# Patient Record
Sex: FEMALE | Race: WHITE | NOT HISPANIC OR LATINO | Employment: UNEMPLOYED | URBAN - METROPOLITAN AREA
[De-identification: names, ages, dates, MRNs, and addresses within clinical notes are randomized per-mention and may not be internally consistent; named-entity substitution may affect disease eponyms.]

---

## 2017-02-15 ENCOUNTER — APPOINTMENT (OUTPATIENT)
Dept: GENERAL RADIOLOGY | Facility: CLINIC | Age: 22
End: 2017-02-15
Attending: FAMILY MEDICINE
Payer: MEDICAID

## 2017-02-15 ENCOUNTER — HOSPITAL ENCOUNTER (EMERGENCY)
Facility: CLINIC | Age: 22
Discharge: HOME | End: 2017-02-15
Attending: FAMILY MEDICINE | Admitting: FAMILY MEDICINE
Payer: MEDICAID

## 2017-02-15 VITALS
TEMPERATURE: 98.1 F | DIASTOLIC BLOOD PRESSURE: 82 MMHG | OXYGEN SATURATION: 98 % | RESPIRATION RATE: 16 BRPM | SYSTOLIC BLOOD PRESSURE: 145 MMHG

## 2017-02-15 DIAGNOSIS — K62.89 RECTAL PAIN: ICD-10-CM

## 2017-02-15 DIAGNOSIS — K59.00 CONSTIPATION, UNSPECIFIED CONSTIPATION TYPE: ICD-10-CM

## 2017-02-15 DIAGNOSIS — R10.84 ABDOMINAL PAIN, GENERALIZED: ICD-10-CM

## 2017-02-15 LAB
ALBUMIN SERPL-MCNC: 3.3 G/DL (ref 3.4–5)
ALBUMIN UR-MCNC: NEGATIVE MG/DL
ALP SERPL-CCNC: 116 U/L (ref 40–150)
ALT SERPL W P-5'-P-CCNC: 27 U/L (ref 0–50)
ANION GAP SERPL CALCULATED.3IONS-SCNC: 5 MMOL/L (ref 3–14)
APPEARANCE UR: CLEAR
AST SERPL W P-5'-P-CCNC: 14 U/L (ref 0–35)
BACTERIA #/AREA URNS HPF: ABNORMAL /HPF
BASOPHILS # BLD AUTO: 0 10E9/L (ref 0–0.2)
BASOPHILS NFR BLD AUTO: 0.3 %
BILIRUB SERPL-MCNC: 0.3 MG/DL (ref 0.2–1.3)
BILIRUB UR QL STRIP: NEGATIVE
BUN SERPL-MCNC: 7 MG/DL (ref 7–30)
CALCIUM SERPL-MCNC: 8.6 MG/DL (ref 8.5–10.1)
CHLORIDE SERPL-SCNC: 109 MMOL/L (ref 96–110)
CO2 SERPL-SCNC: 29 MMOL/L (ref 20–32)
COLOR UR AUTO: YELLOW
CREAT SERPL-MCNC: 0.74 MG/DL (ref 0.5–1)
DIFFERENTIAL METHOD BLD: NORMAL
EOSINOPHIL # BLD AUTO: 0 10E9/L (ref 0–0.7)
EOSINOPHIL NFR BLD AUTO: 0 %
ERYTHROCYTE [DISTWIDTH] IN BLOOD BY AUTOMATED COUNT: 12.4 % (ref 10–15)
GFR SERPL CREATININE-BSD FRML MDRD: ABNORMAL ML/MIN/1.7M2
GLUCOSE SERPL-MCNC: 95 MG/DL (ref 70–99)
GLUCOSE UR STRIP-MCNC: NEGATIVE MG/DL
HCG UR QL: NEGATIVE
HCG UR QL: NORMAL
HCT VFR BLD AUTO: 43.3 % (ref 35–47)
HGB BLD-MCNC: 14 G/DL (ref 11.7–15.7)
HGB UR QL STRIP: NEGATIVE
IMM GRANULOCYTES # BLD: 0 10E9/L (ref 0–0.4)
IMM GRANULOCYTES NFR BLD: 0.3 %
INTERNAL QC OK POCT: YES
INTERNAL QC OK POCT: YES
KETONES UR STRIP-MCNC: NEGATIVE MG/DL
LEUKOCYTE ESTERASE UR QL STRIP: ABNORMAL
LIPASE SERPL-CCNC: 105 U/L (ref 73–393)
LYMPHOCYTES # BLD AUTO: 2.3 10E9/L (ref 0.8–5.3)
LYMPHOCYTES NFR BLD AUTO: 30.7 %
MCH RBC QN AUTO: 30.3 PG (ref 26.5–33)
MCHC RBC AUTO-ENTMCNC: 32.3 G/DL (ref 31.5–36.5)
MCV RBC AUTO: 94 FL (ref 78–100)
MONOCYTES # BLD AUTO: 0.4 10E9/L (ref 0–1.3)
MONOCYTES NFR BLD AUTO: 5.9 %
MUCOUS THREADS #/AREA URNS LPF: PRESENT /LPF
NEUTROPHILS # BLD AUTO: 4.7 10E9/L (ref 1.6–8.3)
NEUTROPHILS NFR BLD AUTO: 62.8 %
NITRATE UR QL: NEGATIVE
NRBC # BLD AUTO: 0 10*3/UL
NRBC BLD AUTO-RTO: 0 /100
PH UR STRIP: 5 PH (ref 5–7)
PLATELET # BLD AUTO: 208 10E9/L (ref 150–450)
POTASSIUM SERPL-SCNC: 4.4 MMOL/L (ref 3.4–5.3)
PROT SERPL-MCNC: 7.2 G/DL (ref 6.8–8.8)
RBC # BLD AUTO: 4.62 10E12/L (ref 3.8–5.2)
RBC #/AREA URNS AUTO: 1 /HPF (ref 0–2)
SODIUM SERPL-SCNC: 143 MMOL/L (ref 133–144)
SP GR UR STRIP: 1.02 (ref 1–1.03)
SQUAMOUS #/AREA URNS AUTO: 2 /HPF (ref 0–1)
URN SPEC COLLECT METH UR: ABNORMAL
UROBILINOGEN UR STRIP-MCNC: NORMAL MG/DL (ref 0–2)
WBC # BLD AUTO: 7.5 10E9/L (ref 4–11)
WBC #/AREA URNS AUTO: 2 /HPF (ref 0–2)

## 2017-02-15 PROCEDURE — 81001 URINALYSIS AUTO W/SCOPE: CPT | Performed by: FAMILY MEDICINE

## 2017-02-15 PROCEDURE — 85025 COMPLETE CBC W/AUTO DIFF WBC: CPT | Performed by: FAMILY MEDICINE

## 2017-02-15 PROCEDURE — 87086 URINE CULTURE/COLONY COUNT: CPT | Performed by: FAMILY MEDICINE

## 2017-02-15 PROCEDURE — 99283 EMERGENCY DEPT VISIT LOW MDM: CPT | Mod: 25 | Performed by: FAMILY MEDICINE

## 2017-02-15 PROCEDURE — 99283 EMERGENCY DEPT VISIT LOW MDM: CPT | Mod: Z6 | Performed by: FAMILY MEDICINE

## 2017-02-15 PROCEDURE — 81025 URINE PREGNANCY TEST: CPT | Mod: 91 | Performed by: FAMILY MEDICINE

## 2017-02-15 PROCEDURE — 96360 HYDRATION IV INFUSION INIT: CPT | Mod: 59 | Performed by: FAMILY MEDICINE

## 2017-02-15 PROCEDURE — 83690 ASSAY OF LIPASE: CPT | Performed by: FAMILY MEDICINE

## 2017-02-15 PROCEDURE — 25000128 H RX IP 250 OP 636: Performed by: FAMILY MEDICINE

## 2017-02-15 PROCEDURE — 46600 DIAGNOSTIC ANOSCOPY SPX: CPT | Performed by: FAMILY MEDICINE

## 2017-02-15 PROCEDURE — 74020 XR ABDOMEN 2 VW: CPT

## 2017-02-15 PROCEDURE — 80053 COMPREHEN METABOLIC PANEL: CPT | Performed by: FAMILY MEDICINE

## 2017-02-15 PROCEDURE — 36415 COLL VENOUS BLD VENIPUNCTURE: CPT | Performed by: FAMILY MEDICINE

## 2017-02-15 RX ADMIN — SODIUM CHLORIDE 1000 ML: 9 INJECTION, SOLUTION INTRAVENOUS at 20:02

## 2017-02-15 NOTE — ED AVS SNAPSHOT
[facility], Emergency Department    [address]    [place] [address]    Phone:  [phone]    Fax:  [phone]                                       Jennifer Okeefe   MRN: [id number]    Department:  [facility], Emergency Department   Date of Visit:  2/15/2017           Patient Information     Date Of Birth          1995        Your diagnoses for this visit were:     Abdominal pain, generalized     Rectal pain     Constipation, unspecified constipation type        You were seen by Manuel Castelan MD.      Follow-up Information     Follow up with Nicolas Flynn    Specialty:  Pediatrics    Why:  As needed    Contact information:    [facility]  [address] [facility] [address] [phone]          Discharge Instructions       Discharged to home plan to start fiber and prune juice as discussed follow-up with her primary M.D. or return if increased pain or rectal bleeding.    24 Hour Appointment Hotline       To make an appointment at any [facility], call [phone] ([phone]). If you don't have a family doctor or clinic, we will help you find one. [place] clinics are conveniently located to serve the needs of you and your family.             Review of your medicines      Our records show that you are taking the medicines listed below. If these are incorrect, please call your family doctor or clinic.        Dose / Directions Last dose taken    ADDERALL PO   Dose:  30 mg        Take 30 mg by mouth daily   Refills:  0        albuterol 108 (90 BASE) MCG/ACT Inhaler   Commonly known as:  PROAIR HFA/PROVENTIL HFA/VENTOLIN HFA   Dose:  2 puff        Inhale 2 puffs into the lungs every 6 hours   Refills:  0        CELEXA PO   Dose:  20 mg   Indication:  Depression        Take 20 mg by mouth   Refills:  0        SIMVASTATIN PO   Dose:  10 mg        Take 10 mg by mouth daily   Refills:  0        TRAZODONE HCL PO        Refills:  0        WELLBUTRIN  "XL PO   Dose:  300 mg        Take 300 mg by mouth   Refills:  0                Procedures and tests performed during your visit     Procedure/Test Number of Times Performed    Abdomen XR, 2 vw, flat and upright 1    CBC with platelets differential 1    Comprehensive metabolic panel 1    Lipase 1    UA with Microscopic reflex to Culture 1    Urine Culture Aerobic Bacterial 1    hCG qual urine POCT 2      Orders Needing Specimen Collection     None      Pending Results     Date and Time Order Name Status Description    2/15/2017 2029 Urine Culture Aerobic Bacterial Preliminary             Pending Culture Results     Date and Time Order Name Status Description    2/15/2017 2029 Urine Culture Aerobic Bacterial Preliminary             Thank you for choosing [facility]       Thank you for choosing [facility] for your care. Our goal is always to provide you with excellent care. Hearing back from our patients is one way we can continue to improve our services. Please take a few minutes to complete the written survey that you may receive in the mail after you visit with us. Thank you!        [employer]Eff[employer] Interactive Information     [employer]BRITE lets you send messages to your doctor, view your test results, renew your prescriptions, schedule appointments and more. To sign up, go to [email or web address] . Click on \"Log in\" on the left side of the screen, which will take you to the Welcome page. Then click on \"Sign up Now\" on the right side of the page.     You will be asked to enter the access code listed below, as well as some personal information. Please follow the directions to create your username and password.     Your access code is: [email or web address]  Expires: 2017 11:36 PM     Your access code will  in 90 days. If you need help or a new code, please call your [facility] clinic or [phone].        Care EveryWhere ID     This is your Care EveryWhere ID. This could be used by other organizations to access your [facility] medical " records  [id number]        After Visit Summary       This is your record. Keep this with you and show to your community pharmacist(s) and doctor(s) at your next visit.

## 2017-02-15 NOTE — ED AVS SNAPSHOT
[facility], Emergency Department    [address]    [facility] [address]    Phone:  [phone]    Fax:  [phone]                                       Jennifer Okeefe   MRN: [id number]    Department:  [facility], Emergency Department   Date of Visit:  2/15/2017           After Visit Summary Signature Page     I have received my discharge instructions, and my questions have been answered. I have discussed any challenges I see with this plan with the nurse or doctor.    ..........................................................................................................................................  Patient/Patient Representative Signature      ..........................................................................................................................................  Patient Representative Print Name and Relationship to Patient    ..................................................               ................................................  Date                                            Time    ..........................................................................................................................................  Reviewed by Signature/Title    ...................................................              ..............................................  Date                                                            Time

## 2017-02-16 LAB
BACTERIA SPEC CULT: NORMAL
Lab: NORMAL
MICRO REPORT STATUS: NORMAL
SPECIMEN SOURCE: NORMAL

## 2017-02-16 NOTE — DISCHARGE INSTRUCTIONS
Discharged to home plan to start fiber and prune juice as discussed follow-up with her primary M.D. or return if increased pain or rectal bleeding.

## 2017-02-16 NOTE — ED NOTES
Having abd. cramping for approx. 2 weeks.  Having both constipation and diarrhea.  Today noticed loose stool was orange in color and pt. c/o anal burning.

## 2017-02-16 NOTE — ED PROVIDER NOTES
[facility] EMERGENCY DEPARTMENT ([facility])    February 15, 2017    ED 4   History     Chief Complaint   Patient presents with     Abdominal Pain     HPI  Jennifer Okeefe is a 21 year old female who presents with abdominal pain. Patient has been known to ongoing constipation she has been experiencing  Constipation for the past week and a half accompanied with some loose stools in the last two days.  Patient also noted after a large hard stool today that she  Blood when she was wiping.    I have reviewed the Medications, Allergies, Past Medical and Surgical History, and Social History in the Epic system.    PERSONAL MEDICAL HISTORY  Past Medical History   Diagnosis Date     ADHD (attention deficit hyperactivity disorder)      Depressive disorder      Uncomplicated asthma      PAST SURGICAL HISTORY  History reviewed. No pertinent past surgical history.  FAMILY HISTORY  No family history on file.  SOCIAL HISTORY  Social History   Substance Use Topics     Smoking status: Never Smoker     Smokeless tobacco: Not on file     Alcohol use No     MEDICATIONS  No current facility-administered medications for this encounter.      Current Outpatient Prescriptions   Medication     Citalopram Hydrobromide (CELEXA PO)     BuPROPion HCl (WELLBUTRIN XL PO)     SIMVASTATIN PO     TRAZODONE HCL PO     Amphetamine-Dextroamphetamine (ADDERALL PO)     albuterol (PROAIR HFA, PROVENTIL HFA, VENTOLIN HFA) 108 (90 BASE) MCG/ACT inhaler     ALLERGIES  No Known Allergies      Review of Systems   Constitutional: Negative for fever.   Respiratory: Negative for shortness of breath.    Cardiovascular: Negative for chest pain.   Gastrointestinal: Positive for abdominal pain, constipation and diarrhea.   All other systems reviewed and are negative.      Physical Exam   BP: 145/88  Heart Rate: 110  Temp: 97.8  F (36.6  C)  Resp: 16  SpO2: 97 %  Physical Exam   Constitutional: No distress.   HENT:   Head: Atraumatic.   Mouth/Throat:  Oropharynx is clear and moist. No oropharyngeal exudate.   Eyes: Pupils are equal, round, and reactive to light. No scleral icterus.   Cardiovascular: Normal heart sounds and intact distal pulses.    Pulmonary/Chest: Breath sounds normal. No respiratory distress.   Abdominal: Soft. Bowel sounds are normal. There is no tenderness. There is no rebound and no guarding.   Musculoskeletal: She exhibits no edema or tenderness.   Skin: Skin is warm. No rash noted. She is not diaphoretic.   rectal exam revealed negative blood in the stool.    ED Course     ED Course     Procedures      anoscopic examination revealed no significant abnormalities there were no Internal or external hemorrhoids  And no obvious rectal bleeding no fissures noted.       Critical Care time:  None     ABDOMEN TWO VIEWS 2/15/2017 8:53 PM      HISTORY: Pain.     COMPARISON: None.         IMPRESSION: No free air. Normal bowel gas pattern.     BUFFY ARELLANO MD            Labs Ordered and Resulted from Time of ED Arrival Up to the Time of Departure from the ED   COMPREHENSIVE METABOLIC PANEL - Abnormal; Notable for the following:        Result Value    Albumin 3.3 (*)     All other components within normal limits   ROUTINE UA WITH MICROSCOPIC REFLEX TO CULTURE - Abnormal; Notable for the following:     Leukocyte Esterase Urine Moderate (*)     Bacteria Urine Moderate (*)     Squamous Epithelial /HPF Urine 2 (*)     Mucous Urine Present (*)     All other components within normal limits   HCG QUAL URINE POCT - Normal   HCG QUAL URINE POCT - Normal   CBC WITH PLATELETS DIFFERENTIAL   LIPASE       Assessments & Plan (with Medical Decision Making)       I have reviewed the nursing notes.    I have reviewed the findings, diagnosis, plan and need for follow up with the patient.  Patient with some intermittent abdominal pain as well as problems with  Constipation at this time evaluation did not reveal any acute rectal bleeding I discussed a regular bowel  program with pt and her mother and they will follow up   With her primary M.D. As well    Discharge Medication List as of 2/15/2017 11:36 PM          Final diagnoses:   Abdominal pain, generalized   Rectal pain   Constipation, unspecified constipation type       2/15/2017   [facility], [place], EMERGENCY DEPARTMENT     Manuel Castelan MD  02/17/17 0986       Manuel Castelan MD  02/17/17 9913

## 2017-02-17 ASSESSMENT — ENCOUNTER SYMPTOMS
DIARRHEA: 1
ABDOMINAL PAIN: 1
SHORTNESS OF BREATH: 0
CONSTIPATION: 1
FEVER: 0

## 2017-04-05 ENCOUNTER — PRE VISIT (OUTPATIENT)
Dept: SURGERY | Facility: CLINIC | Age: 22
End: 2017-04-05

## 2017-04-05 NOTE — TELEPHONE ENCOUNTER
1.  Date/reason for appt: 4/26/17- NBS     2.  Referring provider: DR. MAGDA MAO     3.  Call to patient (Yes / No - short description): No[facility]     4.  Previous care at / records requested from:     1. [facility] - faxed cover sheet.     5.  Other: E-mailed [facility] to sign as back up

## 2017-04-26 ENCOUNTER — OFFICE VISIT (OUTPATIENT)
Dept: SURGERY | Facility: CLINIC | Age: 22
End: 2017-04-26

## 2017-04-26 ENCOUNTER — ALLIED HEALTH/NURSE VISIT (OUTPATIENT)
Dept: SURGERY | Facility: CLINIC | Age: 22
End: 2017-04-26

## 2017-04-26 VITALS
DIASTOLIC BLOOD PRESSURE: 78 MMHG | BODY MASS INDEX: 41.63 KG/M2 | HEART RATE: 120 BPM | OXYGEN SATURATION: 97 % | HEIGHT: 70 IN | SYSTOLIC BLOOD PRESSURE: 125 MMHG | TEMPERATURE: 99.1 F | WEIGHT: 290.8 LBS

## 2017-04-26 DIAGNOSIS — E66.01 MORBID OBESITY, UNSPECIFIED OBESITY TYPE (H): Primary | ICD-10-CM

## 2017-04-26 RX ORDER — TOPIRAMATE 25 MG/1
TABLET, FILM COATED ORAL
Qty: 90 TABLET | Refills: 1 | Status: SHIPPED | OUTPATIENT
Start: 2017-04-26 | End: 2017-05-31

## 2017-04-26 NOTE — PATIENT INSTRUCTIONS
See Ivonne Amin in 1 month  See RD in 1 month    MEDICATION STARTED AT THIS APPOINTMENT  We are starting topiramate at bedtime.  Start one tab, 25 mg, for a week. Go up to 50 mg (2 tabs) for the next week. At the third week, take   3 tabs (75 mg).  Stay at 3 tabs until you are seen again. Call the nurse at [phone] if you have any questions or concerns. (Do not stop taking it if you don't think it's working. For some people it works even though they do not feel much different.)    Topiramate (Topamax) is a medication that is used most often to treat migraine headaches or for seizures. It has also been found to help with weight loss. Although it's not currently FDA approved for weight loss, it has been used safely for a number of years to help people who are carrying extra weight.     Just how topiramate helps with weight loss has not been exactly determined. However it seems to work on areas of the brain to quiet down signals related to eating.      Topiramate may make you:    >feel less interest in eating in between meals   >think less about food and eating   >find it easier to push the plate away   >find giving up pop easier    >have an easier time eating less    For some of our patients, the pills work right away. They feel and think quite differently about food. Other patients don't feel much of a change but find in fact they have lost weight! Like all weight loss medications, topiramate works best when you help it work.  This means:    1) Have less tempting high calorie (fattening) food around the house or office    2) Have lower calorie food (fruits, vegetables,low fat meats and dairy) for snacks    3) Eat out only one time or less each week.   4) Eat your meals at a table with the TV or computer off.    Side-effects. Topiramate is generally well tolerated. The main side-effects we see are:   Tingling in hands,feet, or face (usually not very troublesome)   Mental confusion and word finding trouble (about  10% of patients have this.)     Feeling sleepy or a bit dopey- this goes away very soon after starting.    One of the dangers of topiramate is the possibility of birth defects--if you get pregnant when you are on it, there is the risk that your baby will be born with a cleft lip or palate.  If you are on topiramate and of child bearing age, you need to be on a reliable form of birth control or refrain from sexual intercourse.     Please refer to the pharmacy insert for more information on side-effects. Since many pharmacists are not familiar with the use of topiramate in weight loss, calling the clinic will get you the most accurate information on the use of this medication for weight loss.     In order to get refills of this or any medication we prescribe you must be seen in the medical weight mgmt clinic every 2-3 months. Please have your pharmacy fax a refill request to [phone].

## 2017-04-26 NOTE — NURSING NOTE
"(   Chief Complaint   Patient presents with     Consult     NBS/BMI 39    )    ( Weight: 290 lb 12.8 oz )  ( Height: 5' 9.88\" )  ( BMI (Calculated): 41.95 )  ( Initial Weight: 290 lb 12.8 oz )  ( Cumulative weight loss (lbs): 0 )  (   )  (   )  ( Waist Circumference (cm): 145 cm )  (   )    ( BP: 125/78 )  (   )  ( Temp: 99.1  F (37.3  C) )  ( Temp src: Oral )  ( Pulse: 120 )  (   )  ( SpO2: 97 % )    ( There is no problem list on file for this patient.   )  (   Current Outpatient Prescriptions   Medication Sig Dispense Refill     Citalopram Hydrobromide (CELEXA PO) Take 20 mg by mouth       BuPROPion HCl (WELLBUTRIN XL PO) Take 300 mg by mouth       SIMVASTATIN PO Take 10 mg by mouth daily       albuterol (PROAIR HFA, PROVENTIL HFA, VENTOLIN HFA) 108 (90 BASE) MCG/ACT inhaler Inhale 2 puffs into the lungs every 6 hours       TRAZODONE HCL PO        Amphetamine-Dextroamphetamine (ADDERALL PO) Take 30 mg by mouth daily       )  ( Diabetes Eval:    )    ( Pain Eval:  Data Unavailable )    ( Wound Eval:       )    (   History   Smoking Status     Never Smoker   Smokeless Tobacco     Not on file    )    ( Signed By:  Pacheco Amaral; April 26, 2017; 1:43 PM )    "

## 2017-04-26 NOTE — PATIENT INSTRUCTIONS
"GOALS:  Relating To Eating:  - Eat slowly (20-30 minutes per meal), chewing foods well (25 chews per bite/applesauce consistency).  - 9\" Plate method (1/2 plate non-starchy vegetables/fruit, 1/4 plate lean protein, 1/4 plate whole grain starch - no more than 1/2 cup carb/meal). Okay to have Lean Cuisine and Smart One meals.   - Eat 3 meals per day. Focus on protein first at meals.   - Limit snacking between meals.     Relating to beverages:  - Eliminate calorie-containing beverages.  Try reducing [place] and Poweraid intake by 50% over the next month.    Relating to dietary supplements:  - Start a multivitamin containing iron daily.    Relating to activity:  - Continue exercising 2 days/week at school.     Radha Hsu RD, LD  [phone]    "

## 2017-04-26 NOTE — MR AVS SNAPSHOT
"                  MRN:[id number]                      After Visit Summary   4/26/2017    Jennifer Okeefe    MRN: [id number]           Visit Information        Provider Department      4/26/2017 2:30 PM Sarah Hsu RD  [facility]        Care Instructions    GOALS:  Relating To Eating:  - Eat slowly (20-30 minutes per meal), chewing foods well (25 chews per bite/applesauce consistency).  - 9\" Plate method (1/2 plate non-starchy vegetables/fruit, 1/4 plate lean protein, 1/4 plate whole grain starch - no more than 1/2 cup carb/meal). Okay to have Lean Cuisine and Smart One meals.   - Eat 3 meals per day. Focus on protein first at meals.   - Limit snacking between meals.     Relating to beverages:  - Eliminate calorie-containing beverages.  Try reducing [place] and Poweraid intake by 50% over the next month.    Relating to dietary supplements:  - Start a multivitamin containing iron daily.    Relating to activity:  - Continue exercising 2 days/week at school.     Radha Hsu RD, LD  [phone]         TELOS Information     TELOS gives you secure access to your electronic health record. If you see a primary care provider, you can also send messages to your care team and make appointments. If you have questions, please call your primary care clinic.  If you do not have a primary care provider, please call [phone] and they will assist you.      TELOS is an electronic gateway that provides easy, online access to your medical records. With TELOS, you can request a clinic appointment, read your test results, renew a prescription or communicate with your care team.     To access your existing account, please contact your [facility] or call [phone] for assistance.        Care EveryWhere ID     This is your Care EveryWhere ID. This could be used by other organizations to access your [facility] medical records  [id number]        "

## 2017-04-26 NOTE — LETTER
"2017       RE: Jennifer Okeefe  [address]  [facility] [address]     Dear Colleague,    Thank you for referring your patient, Jennifer Okeefe, to the [facility] SURGICAL WEIGHT MANAGEMENT at [facility]. Please see a copy of my visit note below.    New Bariatric Surgery Consultation Note    RE: Jennifer Okeefe  MR#: [id number]  : 1995      Referring provider:       2017   Who referred you? Dr. Nicolas Flynn       Chief Complaint/Reason for visit: evaluation for possible weight loss surgery    Dear Nicolas Flynn (General),    I had the pleasure of seeing your patient, Jennifer Okeefe, to evaluate her obesity and consider her for possible weight loss surgery. As you know, Jennifer Okeefe is 21 year old.  She has a height of 5' 9.882\", a weight of 290 lbs 12.8 oz, and calculated Body mass index is 41.87 kg/(m^2).    HISTORY OF PRESENT ILLNESS:  Weight Loss History Reviewed with Patient 2017   How long have you been overweight? Since puberty   What is the most that you have ever weighed? 290   What is the most weight you have lost? 15   I have tried the following methods to lose weight Watching portions or calories, Exercise   Have you ever had weight loss surgery? No   15 lb weight loss with adderall      CO-MORBIDITIES OF OBESITY INCLUDE:     2017   I have the following co-morbidities associated with obesity: Pre-Diabetes, High Blood Pressure, High Cholesterol, Asthma       PAST MEDICAL HISTORY:  Past Medical History:   Diagnosis Date     ADHD (attention deficit hyperactivity disorder)      Depressive disorder      Uncomplicated asthma      PAST SURGICAL HISTORY:  History reviewed. No pertinent surgical history.    FAMILY HISTORY:   Family History   Problem Relation Age of Onset     DIABETES Paternal Grandmother      Hypertension Paternal Grandmother      CEREBROVASCULAR DISEASE Paternal Grandmother      DIABETES Other      Hyperlipidemia " Father      Depression Mother      Anxiety Disorder Mother        SOCIAL HISTORY:   Social History Questions Reviewed With Patient 4/14/2017   Which best describes your employment status (select all that apply) I am a student   Which best describes your marital status: single   Do you have children? No   Who do you have in your support network that can be available to help you for the first 2 weeks after surgery? my mom   Who can you count on for support throughout your weight loss surgery journey? my mom   Student at [employer] in [place].    HABITS:     4/14/2017   How often do you drink alcohol? Never   Do you currently use any of the following Nicotine products? No   Have you ever used any of the following nicotine products? No   Have you or are you currently using street drugs or prescription strength medication for which you do not have a prescription for? No   Do you have a history of chemical dependency (alcohol or drug abuse)? No       PSYCHOLOGICAL HISTORY:   Psychological History Reviewed With Patient 4/14/2017   Have you ever attempted suicide? Never.   Have you had thoughts of suicide in the past year? No   Have you ever been hospitalized for mental illness or a suicide attempt? Never.   Do you have a history of chronic pain? No   Have you ever been diagnosed with fibromyalgia? No   Are you currently being treated for any of the following? (select all that apply) Depression, Anxiety, ADHD   Are you currently seeing a therapist or counselor?  Yes   Are you currently seeing a psychiatrist? No       ROS:     4/14/2017   Skin:  None of the above   HEENT: Headaches, Dizziness/lightheadedness, None of these   Musculoskeletal: None of the above   Cardiovascular: Shortness of breath with activity   Pulmonary: Shortness of breath with activity, Excessively sleep during the day, Expereince morning headaches   Gastrointestinal: Constipation   Genitourinary: None of the above   Hematological: None of the above  "  Neurological: None of the above   Female ONLY: Loss of menstrual cycles, Birth control       EATING BEHAVIORS:     4/14/2017   Have you or anyone else thought that you had an eating disorder? No   Do you currently binge eat (eat a large amount of food in a short time)? Yes   Are you an emotional eater? No   Do you get up to eat after falling asleep? Yes       EXERCISE:     4/14/2017   How often do you exercise? Less than 1 time per week   What is the duration of your exercise (in minutes)? I do not exercise.   What types of exercise do you do? I don't exercise   What keeps you from being more active?  Shortness of breath, Too tired       MEDICATIONS:  Current Outpatient Prescriptions   Medication     Citalopram Hydrobromide (CELEXA PO)     BuPROPion HCl (WELLBUTRIN XL PO)     SIMVASTATIN PO     albuterol (PROAIR HFA, PROVENTIL HFA, VENTOLIN HFA) 108 (90 BASE) MCG/ACT inhaler     TRAZODONE HCL PO     Amphetamine-Dextroamphetamine (ADDERALL PO)     No current facility-administered medications for this visit.        ALLERGIES:  No Known Allergies    LABS/IMAGING/MEDICAL RECORDS REVIEW: None    PHYSICAL EXAM:  /78 (BP Location: Left arm)  Pulse 120  Temp 99.1  F (37.3  C) (Oral)  Ht 5' 9.88\"  Wt 290 lb 12.8 oz  SpO2 97%  BMI 41.87 kg/m2   Wt Readings from Last 3 Encounters:   04/26/17 290 lb 12.8 oz (>99 %)*   01/26/15 249 lb 5.4 oz (>99 %)*   08/31/13 274 lb 11.1 oz (>99 %)*     * Growth percentiles are based on CDC 2-20 Years data.     Ht Readings from Last 2 Encounters:   04/26/17 5' 9.88\" (99 %)*     * Growth percentiles are based on CDC 2-20 Years data.     99 %ile based on CDC 2-20 Years BMI-for-age data using vitals from 4/26/2017.    General: NAD  Neurologic: A & O x 3, gait normal  Head: normocephalic, atraumatic  HEENT: PERRL, EOMI.   Respiratory: respirations unlabored  Abdomen: Centrally Obese, Soft NT ND   Extremities: No LE swelling   Skin: warm and dry.  No rashes on exposed " skin  Psychiatric: Mentation and Affect appear normal    In summary, Jennifer Okeefe has Class III obesity with a body mass index of Body mass index is 41.87 kg/(m^2). kg/m2 and the comorbidities stated above. She is interested in weight management.  She has not tried any type of supervised medical weight management before so I have recommended this and Jennifer agrees.    We discussed weight loss medication options and agreed to start topiramate ramp to 75mg.    Side effects reviewed and handout given.    Follow up in 1 month with Ivonne and PEARL    MEDICATION STARTED AT THIS APPOINTMENT  We are starting topiramate at bedtime.  Start one tab, 25 mg, for a week. Go up to 50 mg (2 tabs) for the next week. At the third week, take   3 tabs (75 mg).  Stay at 3 tabs until you are seen again. Call the nurse at [phone] if you have any questions or concerns. (Do not stop taking it if you don't think it's working. For some people it works even though they do not feel much different.)    Topiramate (Topamax) is a medication that is used most often to treat migraine headaches or for seizures. It has also been found to help with weight loss. Although it's not currently FDA approved for weight loss, it has been used safely for a number of years to help people who are carrying extra weight.     Just how topiramate helps with weight loss has not been exactly determined. However it seems to work on areas of the brain to quiet down signals related to eating.      Topiramate may make you:    >feel less interest in eating in between meals   >think less about food and eating   >find it easier to push the plate away   >find giving up pop easier    >have an easier time eating less    For some of our patients, the pills work right away. They feel and think quite differently about food. Other patients don't feel much of a change but find in fact they have lost weight! Like all weight loss medications, topiramate works best when you help  it work.  This means:    1) Have less tempting high calorie (fattening) food around the house or office    2) Have lower calorie food (fruits, vegetables,low fat meats and dairy) for snacks    3) Eat out only one time or less each week.   4) Eat your meals at a table with the TV or computer off.    Side-effects. Topiramate is generally well tolerated. The main side-effects we see are:   Tingling in hands,feet, or face (usually not very troublesome)   Mental confusion and word finding trouble (about 10% of patients have this.)     Feeling sleepy or a bit dopey- this goes away very soon after starting.    One of the dangers of topiramate is the possibility of birth defects--if you get pregnant when you are on it, there is the risk that your baby will be born with a cleft lip or palate.  If you are on topiramate and of child bearing age, you need to be on a reliable form of birth control or refrain from sexual intercourse.     Please refer to the pharmacy insert for more information on side-effects. Since many pharmacists are not familiar with the use of topiramate in weight loss, calling the clinic will get you the most accurate information on the use of this medication for weight loss.     In order to get refills of this or any medication we prescribe you must be seen in the medical weight mgmt clinic every 2-3 months. Please have your pharmacy fax a refill request to [phone].      Sincerely,    Ivonne Amin PA-C    I spent a total of 30 minutes face to face with Jennifer during today's office visit. Over 50% of this time was spent counseling the patient and/or coordinating care.

## 2017-04-26 NOTE — PROGRESS NOTES
"New Bariatric Surgery Consultation Note    RE: Jennifer Okeefe  MR#: [id number]  : 1995      Referring provider:       2017   Who referred you? Dr. Nicolas Flynn       Chief Complaint/Reason for visit: evaluation for possible weight loss surgery    Dear Nicolas Flynn (General),    I had the pleasure of seeing your patient, Jennifer Okeefe, to evaluate her obesity and consider her for possible weight loss surgery. As you know, Jennifer Okeefe is 21 year old.  She has a height of 5' 9.882\", a weight of 290 lbs 12.8 oz, and calculated Body mass index is 41.87 kg/(m^2).    HISTORY OF PRESENT ILLNESS:  Weight Loss History Reviewed with Patient 2017   How long have you been overweight? Since puberty   What is the most that you have ever weighed? 290   What is the most weight you have lost? 15   I have tried the following methods to lose weight Watching portions or calories, Exercise   Have you ever had weight loss surgery? No   15 lb weight loss with adderall      CO-MORBIDITIES OF OBESITY INCLUDE:     2017   I have the following co-morbidities associated with obesity: Pre-Diabetes, High Blood Pressure, High Cholesterol, Asthma       PAST MEDICAL HISTORY:  Past Medical History:   Diagnosis Date     ADHD (attention deficit hyperactivity disorder)      Depressive disorder      Uncomplicated asthma      PAST SURGICAL HISTORY:  History reviewed. No pertinent surgical history.    FAMILY HISTORY:   Family History   Problem Relation Age of Onset     DIABETES Paternal Grandmother      Hypertension Paternal Grandmother      CEREBROVASCULAR DISEASE Paternal Grandmother      DIABETES Other      Hyperlipidemia Father      Depression Mother      Anxiety Disorder Mother        SOCIAL HISTORY:   Social History Questions Reviewed With Patient 2017   Which best describes your employment status (select all that apply) I am a student   Which best describes your marital status: single   Do you have children? " No   Who do you have in your support network that can be available to help you for the first 2 weeks after surgery? my mom   Who can you count on for support throughout your weight loss surgery journey? my mom   Student at [employer] [[email or web address]] in [place] [place] [employer]".    HABITS:     4/14/2017   How often do you drink alcohol? Never   Do you currently use any of the following Nicotine products? No   Have you ever used any of the following nicotine products? No   Have you or are you currently using street drugs or prescription strength medication for which you do not have a prescription for? No   Do you have a history of chemical dependency (alcohol or drug abuse)? No       PSYCHOLOGICAL HISTORY:   Psychological History Reviewed With Patient 4/14/2017   Have you ever attempted suicide? Never.   Have you had thoughts of suicide in the past year? No   Have you ever been hospitalized for mental illness or a suicide attempt? Never.   Do you have a history of chronic pain? No   Have you ever been diagnosed with fibromyalgia? No   Are you currently being treated for any of the following? (select all that apply) Depression, Anxiety, ADHD   Are you currently seeing a therapist or counselor?  Yes   Are you currently seeing a psychiatrist? No       ROS:     4/14/2017   Skin:  None of the above   HEENT: Headaches, Dizziness/lightheadedness, None of these   Musculoskeletal: None of the above   Cardiovascular: Shortness of breath with activity   Pulmonary: Shortness of breath with activity, Excessively sleep during the day, Expereince morning headaches   Gastrointestinal: Constipation   Genitourinary: None of the above   Hematological: None of the above   Neurological: None of the above   Female ONLY: Loss of menstrual cycles, Birth control       EATING BEHAVIORS:     4/14/2017   Have you or anyone else thought that you had an eating disorder? No   Do you currently binge eat (eat a large amount of food in a short time)? Yes   Are you an emotional  "eater? No   Do you get up to eat after falling asleep? Yes       EXERCISE:     4/14/2017   How often do you exercise? Less than 1 time per week   What is the duration of your exercise (in minutes)? I do not exercise.   What types of exercise do you do? I don't exercise   What keeps you from being more active?  Shortness of breath, Too tired       MEDICATIONS:  Current Outpatient Prescriptions   Medication     Citalopram Hydrobromide (CELEXA PO)     BuPROPion HCl (WELLBUTRIN XL PO)     SIMVASTATIN PO     albuterol (PROAIR HFA, PROVENTIL HFA, VENTOLIN HFA) 108 (90 BASE) MCG/ACT inhaler     TRAZODONE HCL PO     Amphetamine-Dextroamphetamine (ADDERALL PO)     No current facility-administered medications for this visit.        ALLERGIES:  No Known Allergies    LABS/IMAGING/MEDICAL RECORDS REVIEW: None    PHYSICAL EXAM:  /78 (BP Location: Left arm)  Pulse 120  Temp 99.1  F (37.3  C) (Oral)  Ht 5' 9.88\"  Wt 290 lb 12.8 oz  SpO2 97%  BMI 41.87 kg/m2   Wt Readings from Last 3 Encounters:   04/26/17 290 lb 12.8 oz (>99 %)*   01/26/15 249 lb 5.4 oz (>99 %)*   08/31/13 274 lb 11.1 oz (>99 %)*     * Growth percentiles are based on CDC 2-20 Years data.     Ht Readings from Last 2 Encounters:   04/26/17 5' 9.88\" (99 %)*     * Growth percentiles are based on CDC 2-20 Years data.     99 %ile based on CDC 2-20 Years BMI-for-age data using vitals from 4/26/2017.    General: NAD  Neurologic: A & O x 3, gait normal  Head: normocephalic, atraumatic  HEENT: PERRL, EOMI.   Respiratory: respirations unlabored  Abdomen: Centrally Obese, Soft NT ND   Extremities: No LE swelling   Skin: warm and dry.  No rashes on exposed skin  Psychiatric: Mentation and Affect appear normal    In summary, Jennifer Okeefe has Class III obesity with a body mass index of Body mass index is 41.87 kg/(m^2). kg/m2 and the comorbidities stated above. She is interested in weight management.  She has not tried any type of supervised medical weight " management before so I have recommended this and Jennifer agrees.    We discussed weight loss medication options and agreed to start topiramate ramp to 75mg.    Side effects reviewed and handout given.    Follow up in 1 month with Ivonne and PEARL    MEDICATION STARTED AT THIS APPOINTMENT  We are starting topiramate at bedtime.  Start one tab, 25 mg, for a week. Go up to 50 mg (2 tabs) for the next week. At the third week, take   3 tabs (75 mg).  Stay at 3 tabs until you are seen again. Call the nurse at [phone] if you have any questions or concerns. (Do not stop taking it if you don't think it's working. For some people it works even though they do not feel much different.)    Topiramate (Topamax) is a medication that is used most often to treat migraine headaches or for seizures. It has also been found to help with weight loss. Although it's not currently FDA approved for weight loss, it has been used safely for a number of years to help people who are carrying extra weight.     Just how topiramate helps with weight loss has not been exactly determined. However it seems to work on areas of the brain to quiet down signals related to eating.      Topiramate may make you:    >feel less interest in eating in between meals   >think less about food and eating   >find it easier to push the plate away   >find giving up pop easier    >have an easier time eating less    For some of our patients, the pills work right away. They feel and think quite differently about food. Other patients don't feel much of a change but find in fact they have lost weight! Like all weight loss medications, topiramate works best when you help it work.  This means:    1) Have less tempting high calorie (fattening) food around the house or office    2) Have lower calorie food (fruits, vegetables,low fat meats and dairy) for snacks    3) Eat out only one time or less each week.   4) Eat your meals at a table with the TV or computer  off.    Side-effects. Topiramate is generally well tolerated. The main side-effects we see are:   Tingling in hands,feet, or face (usually not very troublesome)   Mental confusion and word finding trouble (about 10% of patients have this.)     Feeling sleepy or a bit dopey- this goes away very soon after starting.    One of the dangers of topiramate is the possibility of birth defects--if you get pregnant when you are on it, there is the risk that your baby will be born with a cleft lip or palate.  If you are on topiramate and of child bearing age, you need to be on a reliable form of birth control or refrain from sexual intercourse.     Please refer to the pharmacy insert for more information on side-effects. Since many pharmacists are not familiar with the use of topiramate in weight loss, calling the clinic will get you the most accurate information on the use of this medication for weight loss.     In order to get refills of this or any medication we prescribe you must be seen in the medical weight mgmt clinic every 2-3 months. Please have your pharmacy fax a refill request to [phone].      Sincerely,    Ivonne Amin PA-C    I spent a total of 30 minutes face to face with Jennifer during today's office visit. Over 50% of this time was spent counseling the patient and/or coordinating care.

## 2017-04-26 NOTE — PROGRESS NOTES
"New Medical Weight Managment Nutrition Consultation Note    Reason For Visit: Nutrition Assessment    Jennifer Okeefe is a 21 year old presenting today for new medical weight managment nutrition consult.  Patient is accompanied by pt's mother. Pt was referred by KARL Kennedy (4/26/17).     She is interested in having weight los for the following reasons:  Improve health and self esteem     Support System Reviewed With Patient 4/14/2017   Who do you have in your support network that can be available to help you for the first 2 weeks after surgery? my mom   Who can you count on for support throughout your weight loss surgery journey? my mom       ANTHROPOMETRICS:  Initial Estimated body mass index is 41.87 kg/(m^2) as calculated from the following:    Height as of an earlier encounter on 4/26/17: 1.775 m (5' 9.88\").    Initial Weight on 4/26/17: 290.8 lbs       4/14/2017   I have tried the following methods to lose weight Watching portions or calories, Exercise  -lost 50lbs after starting Adderall       Weight Loss Questions Reviewed With Patient 4/14/2017   How long have you been overweight? Since puberty     SUPPLEMENT INFORMATION:  None    NUTRITION HISTORY:  Recall Diet Questions Reviewed With Patient 4/14/2017   Describe what you typically consume for breakfast (typical or most recent): Orange juice   Lunch skips   Dinner Sometimes skips (grazes throughout the day)   Describe what you typically consume as snacks (typical or most recent): cheez-its, rice crispie bars, candy   How many oz of water, or other low calorie drinks, do you drink daily (8oz=1 glass)? 24 oz - Propel   How many oz of juice, pop, sweet tea, sports drinks, protein drinks, other sweetened drinks, do you drink daily (8oz=1 glass)? 32 oz - Arizona tea or poweraid (4 days a week)   Please indicate the type of milk: 2% - not often   How often do you drink alcohol? Never     *Pt does not like any vegetables so she is going to eat lean protein " "and 1/2 cup starch at meals.  *Pt is going to try Lean Cuisine or Smart Ones meals.     Eating Habits 4/14/2017   Do you have any dietary restrictions? No   Do you currently binge eat (eat a large amount of food in a short time)? Yes   Are you an emotional eater? No   Do you get up to eat after falling asleep? Yes   What foods do you crave? candy bars       Dining Out History Reviewed With Patient 4/14/2017   How often do you dine out? Around once a week.   Where do you dine out? (select all that apply) fast food chains   What types of food do you order when you dine out? Ifbyphone       Physical Activity Reviewed With Patient 4/14/2017   How often do you exercise? 2x/week at school   What is the duration of your exercise (in minutes)? I do not exercise.   What types of exercise do you do? I don't exercise   What keeps you from being more active?  Shortness of breath, Too tired     NUTRITION DIAGNOSIS:  Obesity r/t long history of self-monitoring deficit and excessive energy intake aeb BMI >30.    INTERVENTION:  Intervention Provided/Education Provided on The Plate Method diet, portion/calorie-control, mindful eating, limiting snacking, reducing calories from beverages, meal patterns to help with satiety, and exercise. Gave encouragement and support.  Provided pt with \"The Plate Method\" handout, \"Sources of Protein\" handout, list of goals, and RD contact information.      Questions Reviewed With Patient 4/14/2017   How ready are you to make changes regarding your weight? Number 1 = Not ready at all to make changes up to 10 = very ready. 10   How confident are you that you can change? 1 = Not confident that you will be successful making changes up to 10 = very confident. 7       Patient Understanding: good  Expected Compliance: good    GOALS:  Relating To Eating:  - Eat slowly (20-30 minutes per meal), chewing foods well (25 chews per bite/applesauce consistency).  - 9\" Plate method (1/2 plate non-starchy " vegetables/fruit, 1/4 plate lean protein, 1/4 plate whole grain starch - no more than 1/2 cup carb/meal). Okay to have Lean Cuisine and Smart One meals.   - Eat 3 meals per day. Focus on protein first at meals.   - Limit snacking between meals.     Relating to beverages:  - Eliminate calorie-containing beverages.  Try reducing [place] and Poweraid intake by 50% over the next month.    Relating to dietary supplements:  - Start a multivitamin containing iron daily.    Relating to activity:  - Continue exercising 2 days/week at school.     Follow-Up:   1 month or PRN     Time spent with patient: 30 minutes    Sarah Hsu RD, LD  Pager: [phone]

## 2017-05-31 ENCOUNTER — ALLIED HEALTH/NURSE VISIT (OUTPATIENT)
Dept: SURGERY | Facility: CLINIC | Age: 22
End: 2017-05-31

## 2017-05-31 ENCOUNTER — OFFICE VISIT (OUTPATIENT)
Dept: SURGERY | Facility: CLINIC | Age: 22
End: 2017-05-31

## 2017-05-31 VITALS
HEART RATE: 115 BPM | SYSTOLIC BLOOD PRESSURE: 113 MMHG | WEIGHT: 280.1 LBS | TEMPERATURE: 98.4 F | HEIGHT: 70 IN | OXYGEN SATURATION: 96 % | BODY MASS INDEX: 40.1 KG/M2 | DIASTOLIC BLOOD PRESSURE: 77 MMHG

## 2017-05-31 DIAGNOSIS — E66.01 MORBID OBESITY, UNSPECIFIED OBESITY TYPE (H): ICD-10-CM

## 2017-05-31 RX ORDER — TOPIRAMATE 25 MG/1
75 TABLET, FILM COATED ORAL AT BEDTIME
Qty: 90 TABLET | Refills: 3 | Status: SHIPPED | OUTPATIENT
Start: 2017-05-31 | End: 2017-06-28

## 2017-05-31 NOTE — PROGRESS NOTES
"Pre-Bariatric Surgery Note    Nicolas Flynn    Date: 2017     RE: Jennifer Okeefe    MR#: [id number]   : 1995   Date of Visit: May 31, 2017    REASON FOR VISIT: Pre-operative evaluation for possible weight loss surgery    Dear Nicolas Hardy,    I had the pleasure of seeing your patient, Jennifer Okeefe, in my preoperative bariatric clinic.    As you know, she is morbidly obese and considering weight loss surgery to treat obesity in association with her medical conditions of obesity.  Her consult weight was 290.  She has lost 10 pounds since her consult weight. She has met her required pre-surgery weight.    She is interested in weight loss surgery but we are focusing on medical weight management at this time.  I started her on topiramate last month.      Most recent weights:  Wt Readings from Last 4 Encounters:   17 280 lb 1.6 oz (>99 %)*   17 290 lb 12.8 oz (>99 %)*   01/26/15 249 lb 5.4 oz (>99 %)*   13 274 lb 11.1 oz (>99 %)*     * Growth percentiles are based on CDC 2-20 Years data.       Please refer to initial consult note from date 17 for patient's weight history and co-morbidities.    ROS    Past Medical History:   Diagnosis Date     ADHD (attention deficit hyperactivity disorder)      Depressive disorder      Uncomplicated asthma        History reviewed. No pertinent surgical history.    Current Outpatient Prescriptions   Medication     topiramate (TOPAMAX) 25 MG tablet     Citalopram Hydrobromide (CELEXA PO)     BuPROPion HCl (WELLBUTRIN XL PO)     SIMVASTATIN PO     albuterol (PROAIR HFA, PROVENTIL HFA, VENTOLIN HFA) 108 (90 BASE) MCG/ACT inhaler     TRAZODONE HCL PO     Amphetamine-Dextroamphetamine (ADDERALL PO)     No current facility-administered medications for this visit.        No Known Allergies    PHYSICAL EXAMINATION:  /77  Pulse 115  Temp 98.4  F (36.9  C) (Oral)  Ht 5' 9.88\"  Wt 280 lb 1.6 oz  SpO2 96%  BMI 40.33 kg/m2   Body mass index is " 40.33 kg/(m^2).   NAD NCAT  Respiratory: breathing unlabored  Abdomen: obese, soft/nt/nd      I emphasized exercise and activity behavior along with appropriate food choice as the main foundation for weight loss with surgery providing surgical reinforcement of the appropriate behavior set.    ASSESSMENT/PLAN: 21 y.o. Female here for MW follow up.  She is tolerating topiramate 75mg and has lost 10 lbs.    Continue topiramate 75mg at bedtime.  Hold off on surgery for now.  Follow up with Ivonne Amin in 3 month  See RD in 1 month      If you have any questions about our plans please don't hesitate to contact me.    Sincerely,    Ivonne Amin, PA-C    I spent a total of 10 minutes face to face with Jennifer Okeefe during today's office visit.  Over 50% of this time was spent counseling the patient and/or coordinating care.

## 2017-05-31 NOTE — PATIENT INSTRUCTIONS
Continue topiramate 75mg at bedtime.  Hold off on surgery for now.  Follow up with Ivonne Amin in 3 month  See RD in 1 month

## 2017-05-31 NOTE — PROGRESS NOTES
"Medical Weight Managment Nutrition Consultation Note    Reason For Visit: Nutrition Reassessment    Jennifer Okeefe is a 21 year old presenting today for a follow-up medical weight managment nutrition consult.  Patient is accompanied by pt's mother. Pt was referred by KARL Kennedy (4/26/17).     She is interested in having weight los for the following reasons:  Improve health and self esteem     Support System Reviewed With Patient 4/14/2017   Who do you have in your support network that can be available to help you for the first 2 weeks after surgery? my mom   Who can you count on for support throughout your weight loss surgery journey? my mom       ANTHROPOMETRICS:  Initial Estimated body mass index is 41.87 kg/(m^2) as calculated from the following:    Height as of an earlier encounter on 4/26/17: 1.775 m (5' 9.88\").    Initial Weight on 4/26/17: 290.8 lbs  Current Weight: 280.1 lbs  Weight Change: -10.7 lbs since last month       4/14/2017   I have tried the following methods to lose weight Watching portions or calories, Exercise  -lost 50lbs after starting Adderall       Weight Loss Questions Reviewed With Patient 4/14/2017   How long have you been overweight? Since puberty     SUPPLEMENT INFORMATION:  None    NUTRITION HISTORY:    Progress Towards Previous Goals:   - Eat slowly (20-30 minutes per meal), chewing foods well (25 chews per bite/applesauce consistency). - Improving. Pt is taking 15 minutes to consume meals. Pt is chewing to applesauce consistency 50% of the time.   - 9\" Plate method (1/2 plate non-starchy vegetables/fruit, 1/4 plate lean protein, 1/4 plate whole grain starch - no more than 1/2 cup carb/meal). Okay to have Lean Cuisine and Smart One meals. - Improving. Pt tried Lean Cuisine meals and Smart Ones meals. Pt has eliminated most bread from diet. Pt occasionally has a meal reaplacement shake for breakfast.   - Eat 3 meals per day. Focus on protein first at meals. - Improving. Pt is " not always eating three meals per day because her hunger is greatly reduced.   - Limit snacking between meals. - Met and continues. Pt is not snacking between meals.   - Eliminate calorie-containing beverages.  Try reducing [place] and Poweraid intake by 50% over the next month. - Met and continues. Pt is not drinking [place] Tea or poweraid.   - Start a multivitamin containing iron daily. - Not met. Pt did not start a MVI.   - Continue exercising 2 days/week at school. - Improving. 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     Note (5/31/17): Pt feels the Topiramate is helping decrease hunger.       Recall Diet Questions Reviewed With Patient 4/14/2017   Describe what you typically consume for breakfast (typical or most recent): Orange juice   Lunch skips   Dinner Sometimes skips (grazes throughout the day)   Describe what you typically consume as snacks (typical or most recent): cheez-its, rice crispie bars, candy   How many oz of water, or other low calorie drinks, do you drink daily (8oz=1 glass)? 24 oz - Propel   How many oz of juice, pop, sweet tea, sports drinks, protein drinks, other sweetened drinks, do you drink daily (8oz=1 glass)? 32 oz - [place] tea or poweraid (4 days a week)   Please indicate the type of milk: 2% - not often   How often do you drink alcohol? Never     *Pt does not like any vegetables so she is going to eat lean protein and 1/2 cup starch at meals.  *Pt is going to try Lean Cuisine or Smart Ones meals.     Eating Habits 4/14/2017   Do you have any dietary restrictions? No   Do you currently binge eat (eat a large amount of food in a short time)? Yes   Are you an emotional eater? No   Do you get up to eat after falling asleep? Yes   What foods do you crave? candy bars     NUTRITION DIAGNOSIS:  Obesity r/t long history of self-monitoring deficit and excessive energy intake aeb BMI >30. - continues, improving.  "    INTERVENTION:  Intervention Provided/Education Provided: Reviewed goals. Praised pt for weight loss and progress with goals. Discussed drinking at least 64oz of calorie-free fluids daily and starting a multivitamin. Gave encouragement and support. Provided pt with list of goals and RD contact information.      Questions Reviewed With Patient 4/14/2017   How ready are you to make changes regarding your weight? Number 1 = Not ready at all to make changes up to 10 = very ready. 10   How confident are you that you can change? 1 = Not confident that you will be successful making changes up to 10 = very confident. 7       Patient Understanding: good  Expected Compliance: good    GOALS:  Relating To Eating:  - Eat slowly (20-30 minutes per meal), chewing foods well (25 chews per bite/applesauce consistency).  - 9\" Plate method (1/2 plate non-starchy vegetables/fruit, 1/4 plate lean protein, 1/4 plate whole grain starch - no more than 1/2 cup carb/meal). Okay to have Lean Cuisine and Smart One meals.   - Eat 3 meals per day. Focus on protein first at meals.   - Limit snacking between meals.     Relating to beverages:  - Continue drinking at least 64oz of water daily.     Relating to dietary supplements:  - Start a multivitamin containing iron daily.    Follow-Up:   1 month or PRN     Time spent with patient: 15 minutes    Sarah Hsu RD, LD  Pager: [phone]      "

## 2017-05-31 NOTE — NURSING NOTE
"(   Chief Complaint   Patient presents with     RECHECK     PBS    )    ( Weight: 280 lb 1.6 oz )  ( Height: 5' 9.88\" )  ( BMI (Calculated): 40.41 )  ( Initial Weight: 290 lb 12.8 oz )  ( Cumulative weight loss (lbs): 10.7 )  (   )  (   )  ( Waist Circumference (cm): 142 cm )  (   )    ( BP: 113/77 )  (   )  ( Temp: 98.4  F (36.9  C) )  ( Temp src: Oral )  ( Pulse: 115 )  (   )  ( SpO2: 96 % )    (   Patient Active Problem List   Diagnosis     Morbid obesity (H)    )  (   Current Outpatient Prescriptions   Medication Sig Dispense Refill     topiramate (TOPAMAX) 25 MG tablet 25mg at bedtime for week 1, 50mg at bedtime for 1 week, and 75mg at bedtime thereafter 90 tablet 1     Citalopram Hydrobromide (CELEXA PO) Take 20 mg by mouth       BuPROPion HCl (WELLBUTRIN XL PO) Take 300 mg by mouth       SIMVASTATIN PO Take 10 mg by mouth daily       albuterol (PROAIR HFA, PROVENTIL HFA, VENTOLIN HFA) 108 (90 BASE) MCG/ACT inhaler Inhale 2 puffs into the lungs every 6 hours       TRAZODONE HCL PO        Amphetamine-Dextroamphetamine (ADDERALL PO) Take 30 mg by mouth daily       )  ( Diabetes Eval:    )    ( Pain Eval:  Data Unavailable )    ( Wound Eval:       )    (   History   Smoking Status     Never Smoker   Smokeless Tobacco     Not on file    )    ( Signed By:  Pacheco Amaral; May 31, 2017; 2:20 PM )    "

## 2017-05-31 NOTE — LETTER
2017       RE: Jennifer Okeefe  [address]  [place] [address]     Dear Colleague,    Thank you for referring your patient, Jennifer Okeefe, to the [facility] SURGICAL WEIGHT MANAGEMENT at [facility]. Please see a copy of my visit note below.    Pre-Bariatric Surgery Note  Nicolas Flynn    Date: 2017     RE: Jennifer Okeefe    MR#: [id number]   : 1995   Date of Visit: May 31, 2017    REASON FOR VISIT: Pre-operative evaluation for possible weight loss surgery    Dear Nicolas Hardy,    I had the pleasure of seeing your patient, Jennifer Okeefe, in my preoperative bariatric clinic.    As you know, she is morbidly obese and considering weight loss surgery to treat obesity in association with her medical conditions of obesity.  Her consult weight was 290.  She has lost 10 pounds since her consult weight. She has met her required pre-surgery weight.    She is interested in weight loss surgery but we are focusing on medical weight management at this time.  I started her on topiramate last month.      Most recent weights:  Wt Readings from Last 4 Encounters:   17 280 lb 1.6 oz (>99 %)*   17 290 lb 12.8 oz (>99 %)*   01/26/15 249 lb 5.4 oz (>99 %)*   13 274 lb 11.1 oz (>99 %)*     * Growth percentiles are based on CDC 2-20 Years data.       Please refer to initial consult note from date 17 for patient's weight history and co-morbidities.    ROS    Past Medical History:   Diagnosis Date     ADHD (attention deficit hyperactivity disorder)      Depressive disorder      Uncomplicated asthma        History reviewed. No pertinent surgical history.    Current Outpatient Prescriptions   Medication     topiramate (TOPAMAX) 25 MG tablet     Citalopram Hydrobromide (CELEXA PO)     BuPROPion HCl (WELLBUTRIN XL PO)     SIMVASTATIN PO     albuterol (PROAIR HFA, PROVENTIL HFA, VENTOLIN HFA) 108 (90 BASE) MCG/ACT inhaler     TRAZODONE HCL PO      "Amphetamine-Dextroamphetamine (ADDERALL PO)     No current facility-administered medications for this visit.      No Known Allergies    PHYSICAL EXAMINATION:  /77  Pulse 115  Temp 98.4  F (36.9  C) (Oral)  Ht 5' 9.88\"  Wt 280 lb 1.6 oz  SpO2 96%  BMI 40.33 kg/m2   Body mass index is 40.33 kg/(m^2).   NAD NCAT  Respiratory: breathing unlabored  Abdomen: obese, soft/nt/nd      I emphasized exercise and activity behavior along with appropriate food choice as the main foundation for weight loss with surgery providing surgical reinforcement of the appropriate behavior set.    ASSESSMENT/PLAN: 21 y.o. Female here for [facility] follow up.  She is tolerating topiramate 75mg and has lost 10 lbs.    Continue topiramate 75mg at bedtime.  Hold off on surgery for now.  Follow up with Ivonne Amin in 3 month  See RD in 1 month      If you have any questions about our plans please don't hesitate to contact me.    Sincerely,    Ivonne Amin PA-C    I spent a total of 10 minutes face to face with Jennifer Okeefe during today's office visit.  Over 50% of this time was spent counseling the patient and/or coordinating care.        "

## 2017-05-31 NOTE — PATIENT INSTRUCTIONS
"GOALS:  Relating To Eating:  - Eat slowly (20-30 minutes per meal), chewing foods well (25 chews per bite/applesauce consistency).  - 9\" Plate method (1/2 plate non-starchy vegetables/fruit, 1/4 plate lean protein, 1/4 plate whole grain starch - no more than 1/2 cup carb/meal). Okay to have Lean Cuisine and Smart One meals.   - Eat 3 meals per day. Focus on protein first at meals.   - Limit snacking between meals.     Relating to beverages:  - Continue drinking at least 64oz of water daily.     Relating to dietary supplements:  - Start a multivitamin containing iron daily.    ** Make a dietitian appointment with Radha on June 28th at 2:30pm. If you need to re-schedule this appointment call [phone].    Radha Hsu RD, LD  [phone]    "

## 2017-05-31 NOTE — MR AVS SNAPSHOT
After Visit Summary   5/31/2017    Jennifer Okeefe    MRN: [id number]           Patient Information     Date Of Birth          1995        Visit Information        Provider Department      5/31/2017 2:00 PM Ivonne Amin PA-C [facility] Surgical Weight Management        Today's Diagnoses     Morbid obesity, unspecified obesity type (H)          Care Instructions    Continue topiramate 75mg at bedtime.  Hold off on surgery for now.  Follow up with Ivonne Amin in 3 month  See RD in 1 month            Follow-ups after your visit        Who to contact     Please call your clinic at [phone] to:    Ask questions about your health    Make or cancel appointments    Discuss your medicines    Learn about your test results    Speak to your doctor   If you have compliments or concerns about an experience at your clinic, or if you wish to file a complaint, please contact [facility] Patient Relations at [phone] or email us at [email or web address]         Additional Information About Your Visit        MyChart Information     [facility] gives you secure access to your electronic health record. If you see a primary care provider, you can also send messages to your care team and make appointments. If you have questions, please call your primary care clinic.  If you do not have a primary care provider, please call [phone] and they will assist you.      [facility] is an electronic gateway that provides easy, online access to your medical records. With [facility], you can request a clinic appointment, read your test results, renew a prescription or communicate with your care team.     To access your existing account, please contact your [facility] or call [phone] for assistance.        Care EveryWhere ID     This is your Care EveryWhere ID. This could be used by other organizations to access your [facility]  "records  [id number]        Your Vitals Were     Pulse Temperature Height Pulse Oximetry BMI (Body Mass Index)       115 98.4  F (36.9  C) (Oral) 5' 9.88\" 96% 40.33 kg/m2        Blood Pressure from Last 3 Encounters:   05/31/17 113/77   04/26/17 125/78   02/15/17 145/82    Weight from Last 3 Encounters:   05/31/17 280 lb 1.6 oz (>99 %)*   04/26/17 290 lb 12.8 oz (>99 %)*   01/26/15 249 lb 5.4 oz (>99 %)*     * Growth percentiles are based on [facility] 2-20 Years data.              Today, you had the following     No orders found for display         Today's Medication Changes          These changes are accurate as of: 5/31/17  2:33 PM.  If you have any questions, ask your nurse or doctor.               These medicines have changed or have updated prescriptions.        Dose/Directions    topiramate 25 MG tablet   Commonly known as:  TOPAMAX   This may have changed:    - how much to take  - how to take this  - when to take this  - additional instructions   Used for:  Morbid obesity, unspecified obesity type (H)   Changed by:  Ivonne Amin PA-C        Dose:  75 mg   Take 3 tablets (75 mg) by mouth At Bedtime   Quantity:  90 tablet   Refills:  3            Where to get your medicines      These medications were sent to [facility] [address] [address] AT [address] [address]     Phone:  [phone]     topiramate 25 MG tablet                Primary Care Provider Office Phone # Fax #    Nicolas OLEGARIO Nettlesroxannhu [phone]       [address] [address]        Thank you!     Thank you for choosing [facility] SURGICAL WEIGHT MANAGEMENT  for your care. Our goal is always to provide you with excellent care. Hearing back from our patients is one way we can continue to improve our services. Please take a few minutes to complete the written survey that you may receive in the mail after your visit with us. Thank " you!             Your Updated Medication List - Protect others around you: Learn how to safely use, store and throw away your medicines at www.disposemymeds.org.          This list is accurate as of: 5/31/17  2:33 PM.  Always use your most recent med list.                   Brand Name Dispense Instructions for use    ADDERALL PO      Take 30 mg by mouth daily       albuterol 108 (90 BASE) MCG/ACT Inhaler    PROAIR HFA/PROVENTIL HFA/VENTOLIN HFA     Inhale 2 puffs into the lungs every 6 hours       CELEXA PO      Take 20 mg by mouth       SIMVASTATIN PO      Take 10 mg by mouth daily       topiramate 25 MG tablet    TOPAMAX    90 tablet    Take 3 tablets (75 mg) by mouth At Bedtime       TRAZODONE HCL PO          WELLBUTRIN XL PO      Take 300 mg by mouth

## 2017-05-31 NOTE — MR AVS SNAPSHOT
"                  MRN:[id number]                      After Visit Summary   5/31/2017    Jennifer Okeefe    MRN: [id number]           Visit Information        Provider Department      5/31/2017 2:30 PM Sarah Hsu RD [facility] Surgical Weight Management        Care Instructions    GOALS:  Relating To Eating:  - Eat slowly (20-30 minutes per meal), chewing foods well (25 chews per bite/applesauce consistency).  - 9\" Plate method (1/2 plate non-starchy vegetables/fruit, 1/4 plate lean protein, 1/4 plate whole grain starch - no more than 1/2 cup carb/meal). Okay to have Lean Cuisine and Smart One meals.   - Eat 3 meals per day. Focus on protein first at meals.   - Limit snacking between meals.     Relating to beverages:  - Continue drinking at least 64oz of water daily.     Relating to dietary supplements:  - Start a multivitamin containing iron daily.    ** Make a dietitian appointment with Radha on June 28th at 2:30pm. If you need to re-schedule this appointment call [phone].    Radha Hsu RD,   [phone]           [facility] Information     [facility] gives you secure access to your electronic health record. If you see a primary care provider, you can also send messages to your care team and make appointments. If you have questions, please call your primary care clinic.  If you do not have a primary care provider, please call [phone] and they will assist you.      [facility] is an electronic gateway that provides easy, online access to your medical records. With [facility], you can request a clinic appointment, read your test results, renew a prescription or communicate with your care team.     To access your existing account, please contact your [facility] or call [phone] for assistance.        Care EveryWhere ID     This is your Care EveryWhere ID. This could be used by other organizations to access your [facility] medical records  [id number]        "

## 2017-06-17 ENCOUNTER — HEALTH MAINTENANCE LETTER (OUTPATIENT)
Age: 22
End: 2017-06-17

## 2017-06-28 ENCOUNTER — ALLIED HEALTH/NURSE VISIT (OUTPATIENT)
Dept: SURGERY | Facility: CLINIC | Age: 22
End: 2017-06-28

## 2017-06-28 VITALS — WEIGHT: 277.4 LBS | BODY MASS INDEX: 39.94 KG/M2

## 2017-06-28 DIAGNOSIS — E66.01 MORBID OBESITY, UNSPECIFIED OBESITY TYPE (H): ICD-10-CM

## 2017-06-28 RX ORDER — TOPIRAMATE 25 MG/1
50 TABLET, FILM COATED ORAL 2 TIMES DAILY
Qty: 120 TABLET | Refills: 3 | Status: SHIPPED | OUTPATIENT
Start: 2017-06-28 | End: 2017-09-08

## 2017-06-28 NOTE — PROGRESS NOTES
"Medical Weight Managment Nutrition Consultation Note    Reason For Visit: Nutrition Reassessment    Jennifer Okeefe is a 21 year old presenting today for a follow-up medical weight managment nutrition consult.  Patient is accompanied by pt's mother. Pt was referred by KARL Kennedy (4/26/17).     She is interested in having weight los for the following reasons:  Improve health and self esteem     Support System Reviewed With Patient 4/14/2017   Who do you have in your support network that can be available to help you for the first 2 weeks after surgery? my mom   Who can you count on for support throughout your weight loss surgery journey? my mom       ANTHROPOMETRICS:  Initial Estimated body mass index is 41.87 kg/(m^2) as calculated from the following:    Height as of an earlier encounter on 4/26/17: 1.775 m (5' 9.88\").    Initial Weight on 4/26/17: 290.8 lbs  Current Weight: 277.4 lbs  Weight Change: -2.7 lbs since last month; -13.4 lbs since initial weight    SUPPLEMENT INFORMATION:  None    NUTRITION HISTORY:    *Pt does not always take medications on days she doesn't work. Pt had increased sugar intake and snack intake on days she did not take Topiramate.     Progress Towards Previous Goals:   - Eat slowly (20-30 minutes per meal), chewing foods well (25 chews per bite/applesauce consistency). - Continues. Pt is taking 10-15 minutes to consume meals. Pt is not working on chewing applesauce consistency.    - 9\" Plate method (1/2 plate non-starchy vegetables/fruit, 1/4 plate lean protein, 1/4 plate whole grain starch - no more than 1/2 cup carb/meal). Okay to have Lean Cuisine and Smart One meals. - Not met. Pt snacking on crackers, chips, sweets when hungry (which is not very often when taking Topiramate). Pt does not have and established meal pattern.   - Eat 3 meals per day. Focus on protein first at meals. - Not met. Pt is eating very little protein and is not eating any scheduled meals.   - Limit " snacking between meals. - Not met.   - Continue drinking at least 64oz of water daily. - Continues. Pt has eliminated Powerade but increased pink lemonade intake.   - Start a multivitamin containing iron daily. - Not met. Pt is not taking a multivitamin.     Note (5/31/17): Pt feels the Topiramate is helping decrease hunger.   (6/28/17): Pt does not always take medications on days she doesn't work. Pt had increased sugar intake and snack intake on days she did not take Topiramate.       Recall Diet Questions Reviewed With Patient 4/14/2017   Describe what you typically consume for breakfast (typical or most recent): Orange juice   Lunch skips   Dinner Sometimes skips (grazes throughout the day)   Describe what you typically consume as snacks (typical or most recent): cheez-its, rice crispie bars, candy   How many oz of water, or other low calorie drinks, do you drink daily (8oz=1 glass)? 24 oz - Propel   How many oz of juice, pop, sweet tea, sports drinks, protein drinks, other sweetened drinks, do you drink daily (8oz=1 glass)? 32 oz - [place] tea or poweraid (4 days a week)   Please indicate the type of milk: 2% - not often   How often do you drink alcohol? Never     *Pt does not like any vegetables so she is going to eat lean protein and 1/2 cup starch at meals.  *Pt is going to try Lean Cuisine or Smart Ones meals.     Eating Habits 4/14/2017   Do you have any dietary restrictions? No   Do you currently binge eat (eat a large amount of food in a short time)? Yes   Are you an emotional eater? No   Do you get up to eat after falling asleep? Yes   What foods do you crave? candy bars     NUTRITION DIAGNOSIS:  Obesity r/t long history of self-monitoring deficit and excessive energy intake aeb BMI >30. - continues, improving.     INTERVENTION:  Intervention Provided/Education Provided: Reviewed goals. Praised pt for weight loss. Discussed drinking at least 64oz of calorie-free fluids daily and starting a multivitamin.  "Encouraged pt to establish a meal pattern by starting with 1 meal daily and working up to 2 meals and eventually 3 meals and decrease snacking. Gave encouragement and support. Provided pt with list of goals and RD contact information.      Questions Reviewed With Patient 4/14/2017   How ready are you to make changes regarding your weight? Number 1 = Not ready at all to make changes up to 10 = very ready. 10   How confident are you that you can change? 1 = Not confident that you will be successful making changes up to 10 = very confident. 7       Patient Understanding: good  Expected Compliance: good    GOALS:  Relating To Eating:  - Eat slowly (20-30 minutes per meal), chewing foods well (25 chews per bite/applesauce consistency).  - 9\" Plate method (1/2 plate non-starchy vegetables/fruit, 1/4 plate lean protein, 1/4 plate whole grain starch - no more than 1/2 cup carb/meal). Okay to have Lean Cuisine and Smart One meals.   - Eat 3 meals per day. Focus on protein first at meals.   - Limit snacking between meals.   - Focus on developing a meal pattern. Try starting with 1 meal consistently every day.     Relating to beverages:  - Continue drinking at least 64oz of water daily.     Relating to dietary supplements:  - Start a multivitamin containing iron daily.    Follow-Up:   1 month or PRN     Time spent with patient: 30 minutes    Sarah Hsu RD, LD  Pager: [phone]      "

## 2017-06-28 NOTE — MR AVS SNAPSHOT
"                  MRN:[id number]                      After Visit Summary   6/28/2017    Jennifer Okeefe    MRN: [id number]           Visit Information        Provider Department      6/28/2017 11:30 AM Sarah Hsu RD [facility]        Your next 10 appointments already scheduled     Sep 08, 2017  2:00 PM CDT   (Arrive by 1:45 PM)   Pre Bariatric Surgery with Ivonne Amin PA-C   [facility] ([facility])    [address] [place] [id number] [phone]              Care Instructions    GOALS:  Relating To Eating:  - Eat slowly (20-30 minutes per meal), chewing foods well (25 chews per bite/applesauce consistency).  - 9\" Plate method (1/2 plate non-starchy vegetables/fruit, 1/4 plate lean protein, 1/4 plate whole grain starch - no more than 1/2 cup carb/meal). Okay to have Lean Cuisine and Smart One meals.   - Eat 3 meals per day. Focus on protein first at meals.   - Limit snacking between meals.   - Focus on developing a meal pattern. Try starting with 1 meal consistently every day.     Relating to beverages:  - Continue drinking at least 64oz of water daily.     Relating to dietary supplements:  - Start a multivitamin containing iron daily.    Radha Hsu RD,   [phone]           [facility] Information     [facility] gives you secure access to your electronic health record. If you see a primary care provider, you can also send messages to your care team and make appointments. If you have questions, please call your primary care clinic.  If you do not have a primary care provider, please call [phone] and they will assist you.      [facility] [employer] is an electronic gateway that provides easy, online access to your medical records. With [facility] [facility], you can request a clinic appointment, read your test results, renew a prescription or communicate with your care team.     To access your existing account, " please contact your [facility] or call [phone] for assistance.        Care EveryWhere ID     This is your Care EveryWhere ID. This could be used by other organizations to access your [place] medical records  [id number]        Equal Access to Services     KAYLEEN LUX : Eryn Lubin, shaista avila, leonardo salazar, julian dial. So [facility] [phone].    ATENCIÓN: Si habla español, tiene a mann disposición servicios gratuitos de asistencia lingüística. Llame al [phone].    We comply with applicable federal civil rights laws and [place] laws. We do not discriminate on the basis of race, color, national origin, age, disability sex, sexual orientation or gender identity.

## 2017-06-28 NOTE — PATIENT INSTRUCTIONS
"GOALS:  Relating To Eating:  - Eat slowly (20-30 minutes per meal), chewing foods well (25 chews per bite/applesauce consistency).  - 9\" Plate method (1/2 plate non-starchy vegetables/fruit, 1/4 plate lean protein, 1/4 plate whole grain starch - no more than 1/2 cup carb/meal). Okay to have Lean Cuisine and Smart One meals.   - Eat 3 meals per day. Focus on protein first at meals.   - Limit snacking between meals.   - Focus on developing a meal pattern. Try starting with 1 meal consistently every day.     Relating to beverages:  - Continue drinking at least 64oz of water daily.     Relating to dietary supplements:  - Start a multivitamin containing iron daily.    Radha Hsu RD, LD  [phone]    "

## 2017-08-16 ENCOUNTER — ALLIED HEALTH/NURSE VISIT (OUTPATIENT)
Dept: SURGERY | Facility: CLINIC | Age: 22
End: 2017-08-16

## 2017-08-16 VITALS — BODY MASS INDEX: 40.24 KG/M2 | WEIGHT: 279.5 LBS

## 2017-08-16 NOTE — PROGRESS NOTES
"Medical Weight Managment Nutrition Consultation Note    Reason For Visit: Nutrition Reassessment    Jennifer Okeefe is a 22 year old presenting today for a follow-up medical weight managment nutrition consult.  Patient is accompanied by pt's mother. Pt was referred by KARL Kennedy (4/26/17).     She is interested in having weight loss for the following reasons:  Improve health and self esteem     Support System Reviewed With Patient 4/14/2017   Who do you have in your support network that can be available to help you for the first 2 weeks after surgery? my mom   Who can you count on for support throughout your weight loss surgery journey? my mom       ANTHROPOMETRICS:  Initial Estimated body mass index is 41.87 kg/(m^2) as calculated from the following:    Height as of an earlier encounter on 4/26/17: 1.775 m (5' 9.88\").    Initial Weight on 4/26/17: 290.8 lbs  Current Weight: 279.5 lbs   Weight Change: +2.1 lbs in 6 weeks; -11.3 lbs since initial weight    SUPPLEMENT INFORMATION:  MVI with iron and vitamin D (pt had a low vitamin D lab value)    NUTRITION HISTORY:    (5/31/17): Pt feels the Topiramate is helping decrease hunger.   (6/28/17): Pt does not always take medications on days she doesn't work. Pt had increased sugar intake and snack intake on days she did not take Topiramate.   (8/16/17): Pt continues to not take Topiramate consistently. Pt states she will be better about taking her medications once school starts.     Progress Towards Previous Goals:   - Eat slowly (20-30 minutes per meal), chewing foods well (25 chews per bite/applesauce consistency). - Continues.   - 9\" Plate method (1/2 plate non-starchy vegetables/fruit, 1/4 plate lean protein, 1/4 plate whole grain starch - no more than 1/2 cup carb/meal). Okay to have Lean Cuisine and Smart One meals. - Improving. Pt is trying Lean Cuisine or Smart Ones meal for dinner and sometime lunch.   - Eat 3 meals per day. Focus on protein first at " meals. - Improving. Pt is eating [employer] breakfast sandwich. Pt is choosing higher calorie options.  - Limit snacking between meals. - Continues. Pt is snacking a few times a week.   - Focus on developing a meal pattern. Try starting with 1 meal consistently every day. - Improving. Pt is consistently eating dinner meal.  - Continue drinking at least 64oz of water daily. - Improving. Drinking 48oz of water daily.   - Start a multivitamin containing iron daily. - Met. Pt started a multivitamin and vitamin D.     Recall Diet Questions Reviewed With Patient 4/14/2017   Describe what you typically consume for breakfast (typical or most recent): Orange juice   Lunch skips   Dinner Sometimes skips (grazes throughout the day)   Describe what you typically consume as snacks (typical or most recent): cheez-its, rice crispie bars, candy   How many oz of water, or other low calorie drinks, do you drink daily (8oz=1 glass)? 24 oz - Propel   How many oz of juice, pop, sweet tea, sports drinks, protein drinks, other sweetened drinks, do you drink daily (8oz=1 glass)? 32 oz - [place] tea or poweraid (4 days a week)   Please indicate the type of milk: 2% - not often   How often do you drink alcohol? Never     *Pt does not like any vegetables so she is going to eat lean protein and 1/2 cup starch at meals.  *Pt is going to try Lean Cuisine or Smart Ones meals.     Eating Habits 4/14/2017   Do you have any dietary restrictions? No   Do you currently binge eat (eat a large amount of food in a short time)? Yes   Are you an emotional eater? No   Do you get up to eat after falling asleep? Yes   What foods do you crave? candy bars     NUTRITION DIAGNOSIS:  Obesity r/t long history of self-monitoring deficit and excessive energy intake aeb BMI >30. - continues.     INTERVENTION:  Intervention Provided/Education Provided: Reviewed goals. Praised pt for progress with goals. Discussed drinking at least 64oz of calorie-free fluids daily and  "continuing to work on meal pattern. Gave encouragement and support. Provided pt with list of goals and RD contact information.      Questions Reviewed With Patient 4/14/2017   How ready are you to make changes regarding your weight? Number 1 = Not ready at all to make changes up to 10 = very ready. 10   How confident are you that you can change? 1 = Not confident that you will be successful making changes up to 10 = very confident. 7       Patient Understanding: good  Expected Compliance: good    GOALS:  Relating To Eating:  - Eat slowly (20-30 minutes per meal), chewing foods well (25 chews per bite/applesauce consistency).  - 9\" Plate method (1/2 plate non-starchy vegetables/fruit, 1/4 plate lean protein, 1/4 plate whole grain starch - no more than 1/2 cup carb/meal). Okay to have Lean Cuisine and Smart One meals.   - Eat 3 meals per day. Focus on protein first at meals.   - Limit snacking between meals.   - Focus on developing a meal pattern. Try starting with 1 meal consistently every day.     Relating to beverages:  - Continue drinking at least 64oz of water daily.     Relating to dietary supplements:  - Continue taking a multivitamin containing iron daily and vitamin D3 daily.    Follow-Up:   1 month or PRN     Time spent with patient: 15 minutes    Sarah Hsu RD, LD  Pager: [phone]      "

## 2017-08-16 NOTE — PATIENT INSTRUCTIONS
"GOALS:  Relating To Eating:  - Eat slowly (20-30 minutes per meal), chewing foods well (25 chews per bite/applesauce consistency).  - 9\" Plate method (1/2 plate non-starchy vegetables/fruit, 1/4 plate lean protein, 1/4 plate whole grain starch - no more than 1/2 cup carb/meal). Okay to have Lean Cuisine and Smart One meals.   - Eat 3 meals per day. Focus on protein first at meals.   - Limit snacking between meals.   - Focus on developing a meal pattern. Try starting with 1 meal consistently every day.     Relating to beverages:  - Continue drinking at least 64oz of water daily.     Relating to dietary supplements:  - Continue taking a multivitamin containing iron daily and vitamin D3 daily.    Radha Hsu RD, LD  Voicemail: [phone]  Appointments: [phone]    "

## 2017-09-08 ENCOUNTER — OFFICE VISIT (OUTPATIENT)
Dept: SURGERY | Facility: CLINIC | Age: 22
End: 2017-09-08

## 2017-09-08 ENCOUNTER — ALLIED HEALTH/NURSE VISIT (OUTPATIENT)
Dept: SURGERY | Facility: CLINIC | Age: 22
End: 2017-09-08

## 2017-09-08 VITALS
HEART RATE: 104 BPM | OXYGEN SATURATION: 99 % | TEMPERATURE: 98.8 F | BODY MASS INDEX: 39.05 KG/M2 | DIASTOLIC BLOOD PRESSURE: 76 MMHG | SYSTOLIC BLOOD PRESSURE: 119 MMHG | WEIGHT: 271.2 LBS

## 2017-09-08 DIAGNOSIS — E66.01 MORBID OBESITY, UNSPECIFIED OBESITY TYPE (H): ICD-10-CM

## 2017-09-08 RX ORDER — TOPIRAMATE 25 MG/1
50 TABLET, FILM COATED ORAL 2 TIMES DAILY
Qty: 120 TABLET | Refills: 3 | Status: SHIPPED | OUTPATIENT
Start: 2017-09-08 | End: 2018-01-11

## 2017-09-08 ASSESSMENT — PAIN SCALES - GENERAL: PAINLEVEL: NO PAIN (0)

## 2017-09-08 NOTE — PROGRESS NOTES
Pre-Bariatric Surgery Note    Nicolas Flynn    Date: 2017     RE: Jennifer Okeefe    MR#: [id number]   : 1995   Date of Visit: Sep 8, 2017    REASON FOR VISIT: Preoperative evaluation for possible weight loss surgery    Dear Nicolas Hardy,    I had the pleasure of seeing your patient, Jennifer Okeefe, in my preoperative bariatric clinic.    As you know, she is morbidly obese and considering weight loss surgery to treat obesity in association with her medical conditions of obesity.  Her consult weight was 290.  She has lost 19 pounds since her consult weight. She has met her required pre-surgery weight.    Most recent weights:  Wt Readings from Last 4 Encounters:   17 271 lb 3.2 oz   17 279 lb 8 oz   17 277 lb 6.4 oz (>99 %)*   17 280 lb 1.6 oz (>99 %)*     * Growth percentiles are based on CDC 2-20 Years data.       Please refer to initial consult note from date 17 for patient's weight history and co-morbidities.    ROS    Past Medical History:   Diagnosis Date     ADHD (attention deficit hyperactivity disorder)      Depressive disorder      Uncomplicated asthma        History reviewed. No pertinent surgical history.    Current Outpatient Prescriptions   Medication     topiramate (TOPAMAX) 25 MG tablet     Citalopram Hydrobromide (CELEXA PO)     BuPROPion HCl (WELLBUTRIN XL PO)     SIMVASTATIN PO     albuterol (PROAIR HFA, PROVENTIL HFA, VENTOLIN HFA) 108 (90 BASE) MCG/ACT inhaler     TRAZODONE HCL PO     Amphetamine-Dextroamphetamine (ADDERALL PO)     No current facility-administered medications for this visit.        No Known Allergies    PHYSICAL EXAMINATION:  /76 (BP Location: Left arm, Patient Position: Chair, Cuff Size: Adult Large)  Pulse 104  Temp 98.8  F (37.1  C)  Wt 271 lb 3.2 oz  SpO2 99%  BMI 39.05 kg/m2   Body mass index is 39.05 kg/(m^2).   NAD NCAT  Respiratory: breathing unlabored  Abdomen: obese, soft/nt/nd    I emphasized exercise and  activity behavior along with appropriate food choice as the main foundation for weight loss with surgery providing surgical reinforcement of the appropriate behavior set.    PLAN:    Continue topiramate 50mg twice daily.  Move later dose up to supper time to help with evening hunger.  See PEARL and Ivonne Amin in 3 months      Review of general surgery weight loss process    1. Complete preoperative requirements, including weight loss.  Final weight check to confirm MANDATORY weight loss requirement must be documented on a clinic scale.    2. Discuss prior authorization with .    3. History and physical evaluation by PCP of PAC clinic within 30 days of surgery date, preoperative class, and weight check (weigh-in visit) to be scheduled by patient.  Pre-anesthesia clinic for risk evaluation to be scheduled by anesthesia clinic.    4. We cannot guarantee that patient will qualify for surgery unless all preoperative requirements are met, prior authorization from primary insurance company is granted, and insurance changes do not occur.    5. It is possible for patients to regain all weight after weight loss surgery unless they follow guidelines prescribed by our bariatric center.    6. All patients with gastrointestinal complaints after weight loss surgery must have complaints conveyed to the bariatric team for appropriate treatment.    7. Vitamin deficiencies may develop post-bariatric surgery and annual laboratory testing should be performed.    8. Persistent nausea/vomiting after bariatric surgery entails risk of thiamine deficiency and should be treated early.  Vitamin B12 deficiency may develop, especially after gastric bypass surgery and must be recognized.        If you have any questions about our plans please don't hesitate to contact me.    Sincerely,    Ivonne Amin PA-C    I spent a total of 10 minutes face to face with Jennifer Okeefe during today's office visit.  Over 50% of  this time was spent counseling the patient and/or coordinating care.

## 2017-09-08 NOTE — PATIENT INSTRUCTIONS
Continue topiramate 50mg twice daily  Move nighttime dose up to supper time    See Ivonne Amin and PEARL in 3-4 months

## 2017-09-08 NOTE — NURSING NOTE
(   Chief Complaint   Patient presents with     RECHECK     RBS    )    ( Weight: 271 lb 3.2 oz )  (   )  (   )  ( Initial Weight: 290 lb 12.8 oz )  ( Cumulative weight loss (lbs): 19.6 )  ( Last Visits Weight: 279 lb 0.8 oz )  ( Wt change since last visit (lbs): -7.85 )  ( Waist Circumference (cm): 130 cm )  (   )    ( BP: 119/76 )  (   )  ( Temp: 98.8  F (37.1  C) )  (   )  ( Pulse: 104 )  (   )  ( SpO2: 99 % )    (   Patient Active Problem List   Diagnosis     Morbid obesity (H)    )  (   Current Outpatient Prescriptions   Medication Sig Dispense Refill     topiramate (TOPAMAX) 25 MG tablet Take 2 tablets (50 mg) by mouth 2 times daily 120 tablet 3     Citalopram Hydrobromide (CELEXA PO) Take 20 mg by mouth       BuPROPion HCl (WELLBUTRIN XL PO) Take 300 mg by mouth       SIMVASTATIN PO Take 10 mg by mouth daily       albuterol (PROAIR HFA, PROVENTIL HFA, VENTOLIN HFA) 108 (90 BASE) MCG/ACT inhaler Inhale 2 puffs into the lungs every 6 hours       TRAZODONE HCL PO        Amphetamine-Dextroamphetamine (ADDERALL PO) Take 30 mg by mouth daily       )  ( Diabetes Eval:    )    ( Pain Eval:  No Pain (0) )    ( Wound Eval:       )    (   History   Smoking Status     Never Smoker   Smokeless Tobacco     Never Used    )    ( Signed By:  Martir Sow; September 8, 2017; 2:14 PM )

## 2017-09-08 NOTE — MR AVS SNAPSHOT
"                  MRN:[id number]                      After Visit Summary   9/8/2017    Jennifer Okeefe    MRN: [id number]           Visit Information        Provider Department      9/8/2017 2:30 PM Sarah Hsu RD [facility]        Care Instructions    GOALS:  Relating To Eating:  - Eat slowly (20-30 minutes per meal), chewing foods well (25 chews per bite/applesauce consistency).  - 9\" Plate method (1/2 plate non-starchy vegetables/fruit, 1/4 plate lean protein, 1/4 plate whole grain starch - no more than 1/2 cup carb/meal). Okay to have Lean Cuisine and Smart One meals.   - Eat 2-3 meals per day. Focus on protein first at meals. Okay for meal replacement shake for a meal.   - Limit snacking between meals.       Relating to beverages:  - Continue drinking at least 64oz of water daily.     Relating to dietary supplements:  - Continue taking a multivitamin containing iron daily and vitamin D3 daily.    Radha Hsu RD, LD  Voicemail: [phone]  Appointments: [phone]           [employer] Information     [employer] gives you secure access to your electronic health record. If you see a primary care provider, you can also send messages to your care team and make appointments. If you have questions, please call your primary care clinic.  If you do not have a primary care provider, please call [phone] and they will assist you.      [employer] is an electronic gateway that provides easy, online access to your medical records. With [employer], you can request a clinic appointment, read your test results, renew a prescription or communicate with your care team.     To access your existing account, please contact your [facility] or call [phone] for assistance.        Care EveryWhere ID     This is your Care EveryWhere ID. This could be used by other organizations to access your [place] medical records  [id number]        Equal Access to Services     KAYLEEN" GAAR : Hadii di Lubin, waaxda luqadaha, qaybta kaalmada martin, julian dial. So [facility] [phone].    ATENCIÓN: Si habla español, tiene a mann disposición servicios gratuitos de asistencia lingüística. Llame al [phone].    We comply with applicable federal civil rights laws and [place] laws. We do not discriminate on the basis of race, color, national origin, age, disability sex, sexual orientation or gender identity.

## 2017-09-08 NOTE — LETTER
2017       RE: Jennifer Okeefe  [address]  [facility] [address]     Dear Colleague,    Thank you for referring your patient, Jennifer Okeefe, to the [facility] SURGICAL WEIGHT MANAGEMENT at [facility]. Please see a copy of my visit note below.      Pre-Bariatric Surgery Note    Nicolas Flynn    Date: 2017     RE: Jennifer Okeefe    MR#: [id number]   : 1995   Date of Visit: Sep 8, 2017    REASON FOR VISIT: Preoperative evaluation for possible weight loss surgery    Dear Nicolas Hardy,    I had the pleasure of seeing your patient, Jennifer Okeefe, in my preoperative bariatric clinic.    As you know, she is morbidly obese and considering weight loss surgery to treat obesity in association with her medical conditions of obesity.  Her consult weight was 290.  She has lost 19 pounds since her consult weight. She has met her required pre-surgery weight.    Most recent weights:  Wt Readings from Last 4 Encounters:   17 271 lb 3.2 oz   17 279 lb 8 oz   17 277 lb 6.4 oz (>99 %)*   17 280 lb 1.6 oz (>99 %)*     * Growth percentiles are based on CDC 2-20 Years data.       Please refer to initial consult note from date 17 for patient's weight history and co-morbidities.    ROS    Past Medical History:   Diagnosis Date     ADHD (attention deficit hyperactivity disorder)      Depressive disorder      Uncomplicated asthma        History reviewed. No pertinent surgical history.    Current Outpatient Prescriptions   Medication     topiramate (TOPAMAX) 25 MG tablet     Citalopram Hydrobromide (CELEXA PO)     BuPROPion HCl (WELLBUTRIN XL PO)     SIMVASTATIN PO     albuterol (PROAIR HFA, PROVENTIL HFA, VENTOLIN HFA) 108 (90 BASE) MCG/ACT inhaler     TRAZODONE HCL PO     Amphetamine-Dextroamphetamine (ADDERALL PO)     No current facility-administered medications for this visit.        No Known Allergies    PHYSICAL EXAMINATION:  /76  (BP Location: Left arm, Patient Position: Chair, Cuff Size: Adult Large)  Pulse 104  Temp 98.8  F (37.1  C)  Wt 271 lb 3.2 oz  SpO2 99%  BMI 39.05 kg/m2   Body mass index is 39.05 kg/(m^2).   NAD NCAT  Respiratory: breathing unlabored  Abdomen: obese, soft/nt/nd    I emphasized exercise and activity behavior along with appropriate food choice as the main foundation for weight loss with surgery providing surgical reinforcement of the appropriate behavior set.    PLAN:    Continue topiramate 50mg twice daily.  Move later dose up to supper time to help with evening hunger.  See PEARL and Ivonne Amin in 3 months      Review of general surgery weight loss process    1. Complete preoperative requirements, including weight loss.  Final weight check to confirm MANDATORY weight loss requirement must be documented on a clinic scale.    2. Discuss prior authorization with .    3. History and physical evaluation by PCP of PAC clinic within 30 days of surgery date, preoperative class, and weight check (weigh-in visit) to be scheduled by patient.  Pre-anesthesia clinic for risk evaluation to be scheduled by anesthesia clinic.    4. We cannot guarantee that patient will qualify for surgery unless all preoperative requirements are met, prior authorization from primary insurance company is granted, and insurance changes do not occur.    5. It is possible for patients to regain all weight after weight loss surgery unless they follow guidelines prescribed by our bariatric center.    6. All patients with gastrointestinal complaints after weight loss surgery must have complaints conveyed to the bariatric team for appropriate treatment.    7. Vitamin deficiencies may develop post-bariatric surgery and annual laboratory testing should be performed.    8. Persistent nausea/vomiting after bariatric surgery entails risk of thiamine deficiency and should be treated early.  Vitamin B12 deficiency may develop, especially  after gastric bypass surgery and must be recognized.        If you have any questions about our plans please don't hesitate to contact me.    Sincerely,    Ivonne Amin PA-C    I spent a total of 10 minutes face to face with Jennifer Okeefe during today's office visit.  Over 50% of this time was spent counseling the patient and/or coordinating care.        Again, thank you for allowing me to participate in the care of your patient.      Sincerely,    Ivonne Amin PA-C

## 2017-09-08 NOTE — PROGRESS NOTES
"Medical Weight Managment Nutrition Consultation Note    Reason For Visit: Nutrition Reassessment    Jennifer Okeefe is a 22 year old presenting today for a follow-up medical weight managment nutrition consult.  Patient is accompanied by pt's mother. Pt was referred by KARL Kennedy (4/26/17).     She is interested in having weight loss for the following reasons:  Improve health and self esteem     Support System Reviewed With Patient 4/14/2017   Who do you have in your support network that can be available to help you for the first 2 weeks after surgery? my mom   Who can you count on for support throughout your weight loss surgery journey? my mom       ANTHROPOMETRICS:  Initial Estimated body mass index is 41.87 kg/(m^2) as calculated from the following:    Height as of an earlier encounter on 4/26/17: 1.775 m (5' 9.88\").    Initial Weight on 4/26/17: 290.8 lbs  Current Weight: 271.2 lbs  Weight Change: -8.3 lbs in 1 month; -19.6 lbs since initial weight    SUPPLEMENT INFORMATION:  MVI with iron and vitamin D (pt had a low vitamin D lab value)    NUTRITION HISTORY:    (5/31/17): Pt feels the Topiramate is helping decrease hunger.   (6/28/17): Pt does not always take medications on days she doesn't work. Pt had increased sugar intake and snack intake on days she did not take Topiramate.   (8/16/17): Pt continues to not take Topiramate consistently. Pt states she will be better about taking her medications once school starts.     Progress Towards Previous Goals:   - Eat slowly (20-30 minutes per meal), chewing foods well (25 chews per bite/applesauce consistency). - Met and continues.   - 9\" Plate method (1/2 plate non-starchy vegetables/fruit, 1/4 plate lean protein, 1/4 plate whole grain starch - no more than 1/2 cup carb/meal). Okay to have Lean Cuisine and Smart One meals. - Met and continues. Pt is eating a Smart Ones meal and a protein shake for meal replacement. A small snack sometimes in the evening.   - " Eat 3 meals per day. Focus on protein first at meals. - Improving.   - Limit snacking between meals. - Improving.   - Focus on developing a meal pattern. Try starting with 1 meal consistently every day. - Met and continues.  - Continue drinking at least 64oz of water daily. - Met and continues.   - Continue taking a multivitamin containing iron daily and vitamin D3 daily. - Met and continues.     Recall Diet Questions Reviewed With Patient 4/14/2017   Describe what you typically consume for breakfast (typical or most recent): Orange juice   Lunch skips   Dinner Sometimes skips (grazes throughout the day)   Describe what you typically consume as snacks (typical or most recent): cheez-its, rice crispie bars, candy   How many oz of water, or other low calorie drinks, do you drink daily (8oz=1 glass)? 24 oz - Propel   How many oz of juice, pop, sweet tea, sports drinks, protein drinks, other sweetened drinks, do you drink daily (8oz=1 glass)? 32 oz - [place] tea or poweraid (4 days a week)   Please indicate the type of milk: 2% - not often   How often do you drink alcohol? Never     *Pt does not like any vegetables so she is going to eat lean protein and 1/2 cup starch at meals.  *Pt is going to try Lean Cuisine or Smart Ones meals.     Eating Habits 4/14/2017   Do you have any dietary restrictions? No   Do you currently binge eat (eat a large amount of food in a short time)? Yes   Are you an emotional eater? No   Do you get up to eat after falling asleep? Yes   What foods do you crave? candy bars     NUTRITION DIAGNOSIS:  Obesity r/t long history of self-monitoring deficit and excessive energy intake aeb BMI >30. - continues.     INTERVENTION:  Intervention Provided/Education Provided: Reviewed goals. Praised pt for progress with goals and weight loss. Encouraged protein intake. Gave encouragement and support. Provided pt with list of goals and RD contact information.      Questions Reviewed With Patient 4/14/2017  "  How ready are you to make changes regarding your weight? Number 1 = Not ready at all to make changes up to 10 = very ready. 10   How confident are you that you can change? 1 = Not confident that you will be successful making changes up to 10 = very confident. 7       Patient Understanding: good  Expected Compliance: good    GOALS:  Relating To Eating:  - Eat slowly (20-30 minutes per meal), chewing foods well (25 chews per bite/applesauce consistency).  - 9\" Plate method (1/2 plate non-starchy vegetables/fruit, 1/4 plate lean protein, 1/4 plate whole grain starch - no more than 1/2 cup carb/meal). Okay to have Lean Cuisine and Smart One meals.   - Eat 2-3 meals per day. Focus on protein first at meals. Okay for meal replacement shake for a meal.   - Limit snacking between meals.       Relating to beverages:  - Continue drinking at least 64oz of water daily.     Relating to dietary supplements:  - Continue taking a multivitamin containing iron daily and vitamin D3 daily.    Follow-Up:   1 month or PRN     Time spent with patient: 15 minutes    Sarah Hsu RD, LD  Pager: [phone]      "

## 2017-09-08 NOTE — MR AVS SNAPSHOT
After Visit Summary   9/8/2017    Jennifer Okeefe    MRN: [id number]           Patient Information     Date Of Birth          1995        Visit Information        Provider Department      9/8/2017 2:00 PM Ivonne Amin PA-C [facility] Surgical Weight Management        Today's Diagnoses     Morbid obesity, unspecified obesity type (H)          Care Instructions    Continue topiramate 50mg twice daily  Move nighttime dose up to supper time    See Ivonne Amin and PEARL in 3-4 months            Follow-ups after your visit        Who to contact     Please call your clinic at [phone] to:    Ask questions about your health    Make or cancel appointments    Discuss your medicines    Learn about your test results    Speak to your doctor   If you have compliments or concerns about an experience at your clinic, or if you wish to file a complaint, please contact [facility] Patient Relations at [phone] or email us at [email or web address]         Additional Information About Your Visit        MyChart Information     [facility] [employer] gives you secure access to your electronic health record. If you see a primary care provider, you can also send messages to your care team and make appointments. If you have questions, please call your primary care clinic.  If you do not have a primary care provider, please call [phone] and they will assist you.      [facility] [employer] is an electronic gateway that provides easy, online access to your medical records. With [facility] [employer], you can request a clinic appointment, read your test results, renew a prescription or communicate with your care team.     To access your existing account, please contact your [facility] or call [phone] for assistance.        Care EveryWhere ID     This is your Care EveryWhere ID. This could be used by other organizations to access your [place] medical records  [id number]         Your Vitals Were     Pulse Temperature Pulse Oximetry BMI (Body Mass Index)          104 98.8  F (37.1  C) 99% 39.05 kg/m2         Blood Pressure from Last 3 Encounters:   09/08/17 119/76   05/31/17 113/77   04/26/17 125/78    Weight from Last 3 Encounters:   09/08/17 271 lb 3.2 oz   08/16/17 279 lb 8 oz   06/28/17 277 lb 6.4 oz (>99 %)*     * Growth percentiles are based on [facility] 2-20 Years data.              Today, you had the following     No orders found for display         Where to get your medicines      These medications were sent to [facility] [address] [place] [address] AT [address] [address]     Phone:  [phone]     topiramate 25 MG tablet          Primary Care Provider Office Phone # Fax #    Nicolas Flynn [phone]       [address] [address]        Equal Access to Services     AIDAN LUX : Hadii aad ku hadasho Soomaali, waaxda luqadaha, qaybta kaalmada adeegyada, waxgabo perez hayharsh la . So [facility] [phone].    ATENCIÓN: Si habla español, tiene a mann disposición servicios gratuitos de asistencia lingüística. Llame al [phone].    We comply with applicable federal civil rights laws and [place] laws. We do not discriminate on the basis of race, color, national origin, age, disability sex, sexual orientation or gender identity.            Thank you!     Thank you for choosing [facility] SURGICAL WEIGHT MANAGEMENT  for your care. Our goal is always to provide you with excellent care. Hearing back from our patients is one way we can continue to improve our services. Please take a few minutes to complete the written survey that you may receive in the mail after your visit with us. Thank you!             Your Updated Medication List - Protect others around you: Learn how to safely use, store and throw away your medicines at www.disposemymeds.org.           This list is accurate as of: 9/8/17  2:45 PM.  Always use your most recent med list.                   Brand Name Dispense Instructions for use Diagnosis    ADDERALL PO      Take 30 mg by mouth daily        albuterol 108 (90 BASE) MCG/ACT Inhaler    PROAIR HFA/PROVENTIL HFA/VENTOLIN HFA     Inhale 2 puffs into the lungs every 6 hours        CELEXA PO      Take 20 mg by mouth        SIMVASTATIN PO      Take 10 mg by mouth daily        topiramate 25 MG tablet    TOPAMAX    120 tablet    Take 2 tablets (50 mg) by mouth 2 times daily    Morbid obesity, unspecified obesity type (H)       TRAZODONE HCL PO           WELLBUTRIN XL PO      Take 300 mg by mouth

## 2017-09-08 NOTE — PATIENT INSTRUCTIONS
"GOALS:  Relating To Eating:  - Eat slowly (20-30 minutes per meal), chewing foods well (25 chews per bite/applesauce consistency).  - 9\" Plate method (1/2 plate non-starchy vegetables/fruit, 1/4 plate lean protein, 1/4 plate whole grain starch - no more than 1/2 cup carb/meal). Okay to have Lean Cuisine and Smart One meals.   - Eat 2-3 meals per day. Focus on protein first at meals. Okay for meal replacement shake for a meal.   - Limit snacking between meals.       Relating to beverages:  - Continue drinking at least 64oz of water daily.     Relating to dietary supplements:  - Continue taking a multivitamin containing iron daily and vitamin D3 daily.    Radha Hsu RD, LD  Voicemail: [phone]  Appointments: [phone]    "

## 2018-01-11 ENCOUNTER — OFFICE VISIT (OUTPATIENT)
Dept: SURGERY | Facility: CLINIC | Age: 23
End: 2018-01-11
Payer: MEDICAID

## 2018-01-11 VITALS
WEIGHT: 271 LBS | HEIGHT: 70 IN | OXYGEN SATURATION: 100 % | SYSTOLIC BLOOD PRESSURE: 119 MMHG | DIASTOLIC BLOOD PRESSURE: 80 MMHG | TEMPERATURE: 98.1 F | HEART RATE: 118 BPM | BODY MASS INDEX: 38.8 KG/M2

## 2018-01-11 DIAGNOSIS — E66.01 MORBID OBESITY, UNSPECIFIED OBESITY TYPE (H): ICD-10-CM

## 2018-01-11 RX ORDER — TOPIRAMATE 25 MG/1
50 TABLET, FILM COATED ORAL 2 TIMES DAILY
Qty: 120 TABLET | Refills: 3 | Status: SHIPPED | OUTPATIENT
Start: 2018-01-11 | End: 2018-04-19

## 2018-01-11 RX ORDER — TOPIRAMATE 25 MG/1
50 TABLET, FILM COATED ORAL 2 TIMES DAILY
Qty: 120 TABLET | Refills: 3 | Status: CANCELLED | OUTPATIENT
Start: 2018-01-11

## 2018-01-11 NOTE — PROGRESS NOTES
Pre-Bariatric Surgery Note    Nicolas Flynn    Date: 2018     RE: Jennifer Okeefe    MR#: [id number]   : 1995   Date of Visit: 2018    REASON FOR VISIT: Preoperative evaluation for possible weight loss surgery    Dear Nicolas Hardy,    I had the pleasure of seeing your patient, Jennifer Okeefe, in my preoperative bariatric clinic.    As you know, she is morbidly obese and considering weight loss surgery to treat obesity in association with her medical conditions of obesity.  Her consult weight was 290.  She has lost 19 pounds since her consult weight. She has met her required pre-surgery weight.    She is interested in medical weight management and had been taking topiramate 50 mg twice daily. She stopped taking it for 2 weeks of holiday break and restarted 50mg daily. Her weight is stable at 271 lbs since last visit.      Most recent weights:  Wt Readings from Last 4 Encounters:   18 271 lb   17 271 lb 3.2 oz   17 279 lb 8 oz   17 277 lb 6.4 oz (>99 %)*     * Growth percentiles are based on CDC 2-20 Years data.       Please refer to initial consult note from date 17 for patient's weight history and co-morbidities.    ROS    Past Medical History:   Diagnosis Date     ADHD (attention deficit hyperactivity disorder)      Depressive disorder      Uncomplicated asthma        No past surgical history on file.    Current Outpatient Prescriptions   Medication     Escitalopram Oxalate (LEXAPRO PO)     VITAMIN D, CHOLECALCIFEROL, PO     NAPROXEN PO     UNKNOWN TO PATIENT     topiramate (TOPAMAX) 25 MG tablet     BuPROPion HCl (WELLBUTRIN XL PO)     SIMVASTATIN PO     albuterol (PROAIR HFA, PROVENTIL HFA, VENTOLIN HFA) 108 (90 BASE) MCG/ACT inhaler     TRAZODONE HCL PO     Amphetamine-Dextroamphetamine (ADDERALL PO)     No current facility-administered medications for this visit.        No Known Allergies    PHYSICAL EXAMINATION:  /80  Pulse 118  Temp 98.1  " F (36.7  C) (Oral)  Ht 5' 9.88\"  Wt 271 lb  SpO2 100%  BMI 39.02 kg/m2   Body mass index is 39.02 kg/(m^2).   NAD NCAT  Respiratory: breathing unlabored  Abdomen: obese, soft/nt/nd    I emphasized exercise and activity behavior along with appropriate food choice as the main foundation for weight loss with surgery providing surgical reinforcement of the appropriate behavior set.    PLAN:  22 y.o. Female here for [facility] follow up.  She has lost 19 lbs since starting and is tolerating topiramate.  Her weight is the same since last visit September.     Continue topiramate 50mg twice daily.    Remember to taking nighttime dose to help with evening snacking      Review of general surgery weight loss process    1. Complete preoperative requirements, including weight loss.  Final weight check to confirm MANDATORY weight loss requirement must be documented on a clinic scale.    2. Discuss prior authorization with .    3. History and physical evaluation by PCP of PAC clinic within 30 days of surgery date, preoperative class, and weight check (weigh-in visit) to be scheduled by patient.  Pre-anesthesia clinic for risk evaluation to be scheduled by anesthesia clinic.    4. We cannot guarantee that patient will qualify for surgery unless all preoperative requirements are met, prior authorization from primary insurance company is granted, and insurance changes do not occur.    5. It is possible for patients to regain all weight after weight loss surgery unless they follow guidelines prescribed by our bariatric center.    6. All patients with gastrointestinal complaints after weight loss surgery must have complaints conveyed to the bariatric team for appropriate treatment.    7. Vitamin deficiencies may develop post-bariatric surgery and annual laboratory testing should be performed.    8. Persistent nausea/vomiting after bariatric surgery entails risk of thiamine deficiency and should be treated early.  " Vitamin B12 deficiency may develop, especially after gastric bypass surgery and must be recognized.        If you have any questions about our plans please don't hesitate to contact me.    Sincerely,    Ivonne Amin PA-C    I spent a total of 15 minutes face to face with Jennifer Okeefe during today's office visit.  Over 50% of this time was spent counseling the patient and/or coordinating care.

## 2018-01-11 NOTE — MR AVS SNAPSHOT
After Visit Summary   1/11/2018    Jennifer Okeefe    MRN: [id number]           Patient Information     Date Of Birth          1995        Visit Information        Provider Department      1/11/2018 2:15 PM Ivonne Amin PA-C M [facility] Surgical Weight Management        Today's Diagnoses     Morbid obesity, unspecified obesity type (H)          Care Instructions    3-4 months Ivonne Amin return [facility] visit. (not PBS)          Follow-ups after your visit        Your next 10 appointments already scheduled     Jan 11, 2018  2:15 PM CST   (Arrive by 2:00 PM)   Pre Bariatric Surgery with CLAIRE Gordon [facility] Management ([facility] [place] [facility])    [address] [place] [address] [phone]            Jan 11, 2018  2:30 PM CST   NUTRITION VISIT with Sarah Hsu RD   [facility] Surgical Weight Management ([facility] [place] [facility])    [address] [place] [address] [phone]              Who to contact     Please call your clinic at [phone] to:    Ask questions about your health    Make or cancel appointments    Discuss your medicines    Learn about your test results    Speak to your doctor   If you have compliments or concerns about an experience at your clinic, or if you wish to file a complaint, please contact [facility] Patient Relations at [phone] or email us at [email or web address]         Additional Information About Your Visit        [employer] Information     [employer] gives you secure access to your electronic health record. If you see a primary care provider, you can also send messages to your care team and make appointments. If you have questions, please call your primary care clinic.  If you do not have a primary care provider, please call [phone] and they will assist you.      [employer] is an electronic gateway  "that provides easy, online access to your medical records. With [employer], you can request a clinic appointment, read your test results, renew a prescription or communicate with your care team.     To access your existing account, please contact your [facility] or call [phone] for assistance.        Care EveryWhere ID     This is your Care EveryWhere ID. This could be used by other organizations to access your [facility] medical records  [id number]        Your Vitals Were     Pulse Temperature Height Pulse Oximetry BMI (Body Mass Index)       118 98.1  F (36.7  C) (Oral) 5' 9.88\" 100% 39.02 kg/m2        Blood Pressure from Last 3 Encounters:   01/11/18 119/80   09/08/17 119/76   05/31/17 113/77    Weight from Last 3 Encounters:   01/11/18 271 lb   09/08/17 271 lb 3.2 oz   08/16/17 279 lb 8 oz              Today, you had the following     No orders found for display         Today's Medication Changes          These changes are accurate as of: 1/11/18  1:50 PM.  If you have any questions, ask your nurse or doctor.               Stop taking these medicines if you haven't already. Please contact your care team if you have questions.     CELEXA PO   Stopped by:  Ivonne Amin PA-C                Where to get your medicines      These medications were sent to [facility] [id number] [place] [address] AT [address] [address]     Phone:  [phone]     topiramate 25 MG tablet                Primary Care Provider Office Phone # Fax #    Nicolas Flynn [phone]       [place] [address] [facility] [address]        Equal Access to Services     KAYLEEN LUX : Eryn Lubin, shaista avila, leonardo kaalmark salazar, julian dial. So [facility] [phone].    ATENCIÓN: Si habla español, tiene a mann disposición servicios gratuitos de asistencia " lingüística. Gage al [phone].    We comply with applicable federal civil rights laws and [place] laws. We do not discriminate on the basis of race, color, national origin, age, disability, sex, sexual orientation, or gender identity.            Thank you!     Thank you for choosing [facility] SURGICAL WEIGHT MANAGEMENT  for your care. Our goal is always to provide you with excellent care. Hearing back from our patients is one way we can continue to improve our services. Please take a few minutes to complete the written survey that you may receive in the mail after your visit with us. Thank you!             Your Updated Medication List - Protect others around you: Learn how to safely use, store and throw away your medicines at www.disposemymeds.org.          This list is accurate as of: 1/11/18  1:50 PM.  Always use your most recent med list.                   Brand Name Dispense Instructions for use Diagnosis    ADDERALL PO      Take 30 mg by mouth daily        albuterol 108 (90 BASE) MCG/ACT Inhaler    PROAIR HFA/PROVENTIL HFA/VENTOLIN HFA     Inhale 2 puffs into the lungs every 6 hours        LEXAPRO PO      Take 10 mg by mouth daily        NAPROXEN PO      Take 500 mg by mouth        SIMVASTATIN PO      Take 10 mg by mouth daily        topiramate 25 MG tablet    TOPAMAX    120 tablet    Take 2 tablets (50 mg) by mouth 2 times daily    Morbid obesity, unspecified obesity type (H)       TRAZODONE HCL PO           UNKNOWN TO PATIENT      Birth control        VITAMIN D (CHOLECALCIFEROL) PO      Take 2,000 Units by mouth daily        WELLBUTRIN XL PO      Take 300 mg by mouth

## 2018-01-11 NOTE — LETTER
2018       RE: Jennifer Okeefe  [address]  [facility] [address]     Dear Colleague,    Thank you for referring your patient, Jennifer Okeefe, to the [facility] SURGICAL WEIGHT MANAGEMENT at [facility]. Please see a copy of my visit note below.          Pre-Bariatric Surgery Note    Nicolas Flynn    Date: 2018     RE: Jennifer Okeefe    MR#: [id number]   : 1995   Date of Visit: 2018    REASON FOR VISIT: Preoperative evaluation for possible weight loss surgery    Dear Nicolas Hardy,    I had the pleasure of seeing your patient, Jennifer Okeefe, in my preoperative bariatric clinic.    As you know, she is morbidly obese and considering weight loss surgery to treat obesity in association with her medical conditions of obesity.  Her consult weight was 290.  She has lost 19 pounds since her consult weight. She has met her required pre-surgery weight.    She is interested in medical weight management and had been taking topiramate 50 mg twice daily. She stopped taking it for 2 weeks of holiday break and restarted 50mg daily. Her weight is stable at 271 lbs since last visit.      Most recent weights:  Wt Readings from Last 4 Encounters:   18 271 lb   17 271 lb 3.2 oz   17 279 lb 8 oz   17 277 lb 6.4 oz (>99 %)*     * Growth percentiles are based on CDC 2-20 Years data.       Please refer to initial consult note from date 17 for patient's weight history and co-morbidities.    ROS    Past Medical History:   Diagnosis Date     ADHD (attention deficit hyperactivity disorder)      Depressive disorder      Uncomplicated asthma        No past surgical history on file.    Current Outpatient Prescriptions   Medication     Escitalopram Oxalate (LEXAPRO PO)     VITAMIN D, CHOLECALCIFEROL, PO     NAPROXEN PO     UNKNOWN TO PATIENT     topiramate (TOPAMAX) 25 MG tablet     BuPROPion HCl (WELLBUTRIN XL PO)     SIMVASTATIN PO      "albuterol (PROAIR HFA, PROVENTIL HFA, VENTOLIN HFA) 108 (90 BASE) MCG/ACT inhaler     TRAZODONE HCL PO     Amphetamine-Dextroamphetamine (ADDERALL PO)     No current facility-administered medications for this visit.        No Known Allergies    PHYSICAL EXAMINATION:  /80  Pulse 118  Temp 98.1  F (36.7  C) (Oral)  Ht 5' 9.88\"  Wt 271 lb  SpO2 100%  BMI 39.02 kg/m2   Body mass index is 39.02 kg/(m^2).   NAD NCAT  Respiratory: breathing unlabored  Abdomen: obese, soft/nt/nd    I emphasized exercise and activity behavior along with appropriate food choice as the main foundation for weight loss with surgery providing surgical reinforcement of the appropriate behavior set.    PLAN:  22 y.o. Female here for [facility] follow up.  She has lost 19 lbs since starting and is tolerating topiramate.  Her weight is the same since last visit September.     Continue topiramate 50mg twice daily.    Remember to taking nighttime dose to help with evening snacking      Review of general surgery weight loss process    1. Complete preoperative requirements, including weight loss.  Final weight check to confirm MANDATORY weight loss requirement must be documented on a clinic scale.    2. Discuss prior authorization with .    3. History and physical evaluation by PCP of PAC clinic within 30 days of surgery date, preoperative class, and weight check (weigh-in visit) to be scheduled by patient.  Pre-anesthesia clinic for risk evaluation to be scheduled by anesthesia clinic.    4. We cannot guarantee that patient will qualify for surgery unless all preoperative requirements are met, prior authorization from primary insurance company is granted, and insurance changes do not occur.    5. It is possible for patients to regain all weight after weight loss surgery unless they follow guidelines prescribed by our bariatric center.    6. All patients with gastrointestinal complaints after weight loss surgery must have " complaints conveyed to the bariatric team for appropriate treatment.    7. Vitamin deficiencies may develop post-bariatric surgery and annual laboratory testing should be performed.    8. Persistent nausea/vomiting after bariatric surgery entails risk of thiamine deficiency and should be treated early.  Vitamin B12 deficiency may develop, especially after gastric bypass surgery and must be recognized.        If you have any questions about our plans please don't hesitate to contact me.    Sincerely,    Ivonne Amin PA-C    I spent a total of 15 minutes face to face with Jennifer Okeefe during today's office visit.  Over 50% of this time was spent counseling the patient and/or coordinating care.

## 2018-01-11 NOTE — NURSING NOTE
"(   Chief Complaint   Patient presents with     RECHECK     PBS    )    ( Weight: 271 lb )  ( Height: 5' 9.88\" )  ( BMI (Calculated): 39.1 )  ( Initial Weight: 290 lb 12.8 oz )  ( Cumulative weight loss (lbs): 19.8 )  ( Last Visits Weight: 271 lb 3.2 oz )  ( Wt change since last visit (lbs): -0.2 )  (   )  (   )    ( BP: 119/80 )  (   )  ( Temp: 98.1  F (36.7  C) )  ( Temp src: Oral )  ( Pulse: 118 )  (   )  ( SpO2: 100 % )    (   Patient Active Problem List   Diagnosis     Morbid obesity (H)    )  (   Current Outpatient Prescriptions   Medication Sig Dispense Refill     Escitalopram Oxalate (LEXAPRO PO) Take 10 mg by mouth daily       VITAMIN D, CHOLECALCIFEROL, PO Take 2,000 Units by mouth daily       NAPROXEN PO Take 500 mg by mouth       UNKNOWN TO PATIENT Birth control       topiramate (TOPAMAX) 25 MG tablet Take 2 tablets (50 mg) by mouth 2 times daily 120 tablet 3     BuPROPion HCl (WELLBUTRIN XL PO) Take 300 mg by mouth       SIMVASTATIN PO Take 10 mg by mouth daily       albuterol (PROAIR HFA, PROVENTIL HFA, VENTOLIN HFA) 108 (90 BASE) MCG/ACT inhaler Inhale 2 puffs into the lungs every 6 hours       TRAZODONE HCL PO        Amphetamine-Dextroamphetamine (ADDERALL PO) Take 30 mg by mouth daily       )  ( Diabetes Eval:    )    ( Pain Eval:  Data Unavailable )    ( Wound Eval:       )    (   History   Smoking Status     Never Smoker   Smokeless Tobacco     Never Used    )    ( Signed By:  Pacheco Amaral; January 11, 2018; 1:33 PM )    "

## 2018-04-19 ENCOUNTER — OFFICE VISIT (OUTPATIENT)
Dept: SURGERY | Facility: CLINIC | Age: 23
End: 2018-04-19
Payer: MEDICAID

## 2018-04-19 VITALS
OXYGEN SATURATION: 98 % | TEMPERATURE: 98.2 F | BODY MASS INDEX: 39.51 KG/M2 | WEIGHT: 276 LBS | SYSTOLIC BLOOD PRESSURE: 122 MMHG | HEIGHT: 70 IN | HEART RATE: 108 BPM | DIASTOLIC BLOOD PRESSURE: 82 MMHG

## 2018-04-19 DIAGNOSIS — E66.01 MORBID OBESITY, UNSPECIFIED OBESITY TYPE (H): ICD-10-CM

## 2018-04-19 RX ORDER — TOPIRAMATE 25 MG/1
75 TABLET, FILM COATED ORAL 2 TIMES DAILY
Qty: 180 TABLET | Refills: 3 | Status: SHIPPED | OUTPATIENT
Start: 2018-04-19 | End: 2018-09-15

## 2018-04-19 NOTE — NURSING NOTE
"(   Chief Complaint   Patient presents with     RECHECK     PBS    )    ( Weight: 276 lb )  ( Height: 5' 9.88\" )  ( BMI (Calculated): 39.82 )  ( Initial Weight: 290 lb 12.8 oz )  ( Cumulative weight loss (lbs): 14.8 )  ( Last Visits Weight: 271 lb )  ( Wt change since last visit (lbs): 5 )  (   )  (   )    ( BP: 122/82 )  (   )  ( Temp: 98.2  F (36.8  C) )  ( Temp src: Oral )  ( Pulse: 108 )  (   )  ( SpO2: 98 % )    (   Patient Active Problem List   Diagnosis     Morbid obesity (H)    )  (   Current Outpatient Prescriptions   Medication Sig Dispense Refill     albuterol (PROAIR HFA, PROVENTIL HFA, VENTOLIN HFA) 108 (90 BASE) MCG/ACT inhaler Inhale 2 puffs into the lungs every 6 hours       Amphetamine-Dextroamphetamine (ADDERALL PO) Take 30 mg by mouth daily        BuPROPion HCl (WELLBUTRIN XL PO) Take 300 mg by mouth       Escitalopram Oxalate (LEXAPRO PO) Take 10 mg by mouth daily       NAPROXEN PO Take 500 mg by mouth       SIMVASTATIN PO Take 10 mg by mouth daily       topiramate (TOPAMAX) 25 MG tablet Take 2 tablets (50 mg) by mouth 2 times daily 120 tablet 3     TRAZODONE HCL PO        UNKNOWN TO PATIENT Birth control       VITAMIN D, CHOLECALCIFEROL, PO Take 2,000 Units by mouth daily      )  ( Diabetes Eval:    )    ( Pain Eval:  Data Unavailable )    ( Wound Eval:       )    (   History   Smoking Status     Never Smoker   Smokeless Tobacco     Never Used    )    ( Signed By:  Pacheco Amaral; April 19, 2018; 1:36 PM )    "

## 2018-04-19 NOTE — PROGRESS NOTES
"    Return Medical Weight Management Note     Jennifer Okeefe  MRN:  [id number]  :  1995  IDA:  2018    Dear Nicolas Flynn,    I had the pleasure of seeing your patient Jennifer Okeefe.  She is a 22 year old female who I am continuing to see for treatment of obesity related to:       2017   I have the following co-morbidities associated with obesity: Pre-Diabetes, High Blood Pressure, High Cholesterol, Asthma       INTERVAL HISTORY:  Last visit was with me on 18 and we increased topiramate to 50mg BID.  She is up 5 lbs since last visit.  Down 14 lbs overall. She is forgetting to nighttime dose of topiramate sometimes.      CURRENT WEIGHT:   276 lbs 0 oz    Wt Readings from Last 4 Encounters:   18 276 lb   18 271 lb   17 271 lb 3.2 oz   17 279 lb 8 oz       Height:  5' 9.88\"  Body Mass Index:  Body mass index is 39.74 kg/(m^2).  Vitals:  /82  Pulse 108  Temp 98.2  F (36.8  C) (Oral)  Ht 5' 9.88\"  Wt 276 lb  SpO2 98%  BMI 39.74 kg/m2    Initial consult weight was 290 on 17.  Weight change since last seen on 2018 is up 5 pounds.   Total loss is 14 pounds.    No flowsheet data found.    ROS    MEDICATIONS:   Current Outpatient Prescriptions   Medication     albuterol (PROAIR HFA, PROVENTIL HFA, VENTOLIN HFA) 108 (90 BASE) MCG/ACT inhaler     Amphetamine-Dextroamphetamine (ADDERALL PO)     BuPROPion HCl (WELLBUTRIN XL PO)     Escitalopram Oxalate (LEXAPRO PO)     NAPROXEN PO     SIMVASTATIN PO     topiramate (TOPAMAX) 25 MG tablet     TRAZODONE HCL PO     UNKNOWN TO PATIENT     VITAMIN D, CHOLECALCIFEROL, PO     No current facility-administered medications for this visit.        No flowsheet data found.    ASSESSMENT:     22 y.o. Female here for [facility] follow up.  She is tolerating topiramate but sometimes forgetting evening dose.  She has lost 14 lbs overall but has gained 5 lbs since last visit.    PLAN:     Increase topiramate to 75mg BID  Remember " to take night dose with other night time medications.      FOLLOW-UP:    12 weeks.    Time: 15 min spent on evaluation, management, counseling, education, & motivational interviewing with greater than 50 % of the total time was spent on counseling and coordinating care    Sincerely,    Ivonne Amin PA-C

## 2018-04-19 NOTE — LETTER
"2018       RE: Jennifer Okeefe  [address]  [facility] [address]     Dear Colleague,    Thank you for referring your patient, Jennifer Okeefe, to the [facility] SURGICAL WEIGHT MANAGEMENT at [facility]. Please see a copy of my visit note below.        Return Medical Weight Management Note     Jennifer Okeefe  MRN:  [id number]  :  1995  IDA:  2018    Dear Nicolas Flynn,    I had the pleasure of seeing your patient Jennifer Okeefe.  She is a 22 year old female who I am continuing to see for treatment of obesity related to:       2017   I have the following co-morbidities associated with obesity: Pre-Diabetes, High Blood Pressure, High Cholesterol, Asthma       INTERVAL HISTORY:  Last visit was with me on 18 and we increased topiramate to 50mg BID.  She is up 5 lbs since last visit.  Down 14 lbs overall. She is forgetting to nighttime dose of topiramate sometimes.      CURRENT WEIGHT:   276 lbs 0 oz    Wt Readings from Last 4 Encounters:   18 276 lb   18 271 lb   17 271 lb 3.2 oz   17 279 lb 8 oz       Height:  5' 9.88\"  Body Mass Index:  Body mass index is 39.74 kg/(m^2).  Vitals:  /82  Pulse 108  Temp 98.2  F (36.8  C) (Oral)  Ht 5' 9.88\"  Wt 276 lb  SpO2 98%  BMI 39.74 kg/m2    Initial consult weight was 290 on 17.  Weight change since last seen on 2018 is up 5 pounds.   Total loss is 14 pounds.    No flowsheet data found.    ROS    MEDICATIONS:   Current Outpatient Prescriptions   Medication     albuterol (PROAIR HFA, PROVENTIL HFA, VENTOLIN HFA) 108 (90 BASE) MCG/ACT inhaler     Amphetamine-Dextroamphetamine (ADDERALL PO)     BuPROPion HCl (WELLBUTRIN XL PO)     Escitalopram Oxalate (LEXAPRO PO)     NAPROXEN PO     SIMVASTATIN PO     topiramate (TOPAMAX) 25 MG tablet     TRAZODONE HCL PO     UNKNOWN TO PATIENT     VITAMIN D, CHOLECALCIFEROL, PO     No current facility-administered " medications for this visit.        No flowsheet data found.    ASSESSMENT:     22 y.o. Female here for MW follow up.  She is tolerating topiramate but sometimes forgetting evening dose.  She has lost 14 lbs overall but has gained 5 lbs since last visit.    PLAN:     Increase topiramate to 75mg BID  Remember to take night dose with other night time medications.      FOLLOW-UP:    12 weeks.    Time: 15 min spent on evaluation, management, counseling, education, & motivational interviewing with greater than 50 % of the total time was spent on counseling and coordinating care      Again, thank you for allowing me to participate in the care of your patient.      Sincerely,    Ivonne Amin PA-C

## 2018-04-19 NOTE — MR AVS SNAPSHOT
"              After Visit Summary   4/19/2018    Jennifer Okeefe    MRN: [id number]           Patient Information     Date Of Birth          1995        Visit Information        Provider Department      4/19/2018 2:00 PM Ivonne Amin PA-C M [facility] Surgical Weight Management        Today's Diagnoses     Morbid obesity, unspecified obesity type (H)           Follow-ups after your visit        Who to contact     Please call your clinic at [phone] to:    Ask questions about your health    Make or cancel appointments    Discuss your medicines    Learn about your test results    Speak to your doctor            Additional Information About Your Visit        MyChart Information     [employer] gives you secure access to your electronic health record. If you see a primary care provider, you can also send messages to your care team and make appointments. If you have questions, please call your primary care clinic.  If you do not have a primary care provider, please call [phone] and they will assist you.      [employer] is an electronic gateway that provides easy, online access to your medical records. With [employer], you can request a clinic appointment, read your test results, renew a prescription or communicate with your care team.     To access your existing account, please contact your [facility] or call [phone] for assistance.        Care EveryWhere ID     This is your Care EveryWhere ID. This could be used by other organizations to access your [facility] medical records  [id number]        Your Vitals Were     Pulse Temperature Height Pulse Oximetry BMI (Body Mass Index)       108 98.2  F (36.8  C) (Oral) 5' 9.88\" 98% 39.74 kg/m2        Blood Pressure from Last 3 Encounters:   04/19/18 122/82   01/11/18 119/80   09/08/17 119/76    Weight from Last 3 Encounters:   04/19/18 276 lb   01/11/18 271 lb   09/08/17 271 lb 3.2 oz              Today, you had the following     " No orders found for display         Today's Medication Changes          These changes are accurate as of 4/19/18  2:12 PM.  If you have any questions, ask your nurse or doctor.               These medicines have changed or have updated prescriptions.        Dose/Directions    topiramate 25 MG tablet   Commonly known as:  TOPAMAX   This may have changed:  how much to take   Used for:  Morbid obesity, unspecified obesity type (H)   Changed by:  Ivonne Amin PA-C        Dose:  75 mg   Take 3 tablets (75 mg) by mouth 2 times daily   Quantity:  180 tablet   Refills:  3            Where to get your medicines      These medications were sent to [facility] [address] [address] AT [address] [address]     Phone:  [phone]     topiramate 25 MG tablet                Primary Care Provider Office Phone # Fax #    Nicolas MELVIN Rina [phone]       [facility] [address] [facility] [address]  [facility] [address]        Equal Access to Services     AIDAN LUX : Hadii di ku hadasho Soomaali, waaxda luqadaha, qaybta kaalmada adeegyada, waxay idiin hayhildan renuka la . So [facility] [phone].    ATENCIÓN: Si habla español, tiene a mann disposición servicios gratuitos de asistencia lingüística. [facility] [phone].    We comply with applicable federal civil rights laws and [place] laws. We do not discriminate on the basis of race, color, national origin, age, disability, sex, sexual orientation, or gender identity.            Thank you!     Thank you for choosing [facility] SURGICAL WEIGHT MANAGEMENT  for your care. Our goal is always to provide you with excellent care. Hearing back from our patients is one way we can continue to improve our services. Please take a few minutes to complete the written survey that you may receive in the mail after your visit with us. Thank you!             Your Updated Medication  List - Protect others around you: Learn how to safely use, store and throw away your medicines at www.disposemymeds.org.          This list is accurate as of 4/19/18  2:12 PM.  Always use your most recent med list.                   Brand Name Dispense Instructions for use Diagnosis    ADDERALL PO      Take 30 mg by mouth daily        albuterol 108 (90 Base) MCG/ACT Inhaler    PROAIR HFA/PROVENTIL HFA/VENTOLIN HFA     Inhale 2 puffs into the lungs every 6 hours        LEXAPRO PO      Take 10 mg by mouth daily        NAPROXEN PO      Take 500 mg by mouth        SIMVASTATIN PO      Take 10 mg by mouth daily        topiramate 25 MG tablet    TOPAMAX    180 tablet    Take 3 tablets (75 mg) by mouth 2 times daily    Morbid obesity, unspecified obesity type (H)       TRAZODONE HCL PO           UNKNOWN TO PATIENT      Birth control        VITAMIN D (CHOLECALCIFEROL) PO      Take 2,000 Units by mouth daily        WELLBUTRIN XL PO      Take 300 mg by mouth

## 2018-05-21 NOTE — MR AVS SNAPSHOT
After Visit Summary   4/26/2017    Jennifer Okeefe    MRN: [id number]           Patient Information     Date Of Birth          1995        Visit Information        Provider Department      4/26/2017 2:00 PM Ivonne Amin PA-C [facility] Surgical Weight Management        Today's Diagnoses     Morbid obesity, unspecified obesity type (H)    -  1      Care Instructions    See Ivonne Amin in 1 month  See RD in 1 month    MEDICATION STARTED AT THIS APPOINTMENT  We are starting topiramate at bedtime.  Start one tab, 25 mg, for a week. Go up to 50 mg (2 tabs) for the next week. At the third week, take   3 tabs (75 mg).  Stay at 3 tabs until you are seen again. Call the nurse at [phone] if you have any questions or concerns. (Do not stop taking it if you don't think it's working. For some people it works even though they do not feel much different.)    Topiramate (Topamax) is a medication that is used most often to treat migraine headaches or for seizures. It has also been found to help with weight loss. Although it's not currently FDA approved for weight loss, it has been used safely for a number of years to help people who are carrying extra weight.     Just how topiramate helps with weight loss has not been exactly determined. However it seems to work on areas of the brain to quiet down signals related to eating.      Topiramate may make you:    >feel less interest in eating in between meals   >think less about food and eating   >find it easier to push the plate away   >find giving up pop easier    >have an easier time eating less    For some of our patients, the pills work right away. They feel and think quite differently about food. Other patients don't feel much of a change but find in fact they have lost weight! Like all weight loss medications, topiramate works best when you help it work.  This means:    1) Have less tempting high calorie (fattening) food around the house or  office    2) Have lower calorie food (fruits, vegetables,low fat meats and dairy) for snacks    3) Eat out only one time or less each week.   4) Eat your meals at a table with the TV or computer off.    Side-effects. Topiramate is generally well tolerated. The main side-effects we see are:   Tingling in hands,feet, or face (usually not very troublesome)   Mental confusion and word finding trouble (about 10% of patients have this.)     Feeling sleepy or a bit dopey- this goes away very soon after starting.    One of the dangers of topiramate is the possibility of birth defects--if you get pregnant when you are on it, there is the risk that your baby will be born with a cleft lip or palate.  If you are on topiramate and of child bearing age, you need to be on a reliable form of birth control or refrain from sexual intercourse.     Please refer to the pharmacy insert for more information on side-effects. Since many pharmacists are not familiar with the use of topiramate in weight loss, calling the clinic will get you the most accurate information on the use of this medication for weight loss.     In order to get refills of this or any medication we prescribe you must be seen in the medical weight mgmt clinic every 2-3 months. Please have your pharmacy fax a refill request to [phone].                Follow-ups after your visit        Follow-up notes from your care team     Return in 4 weeks (on 5/24/2017).      Who to contact     Please call your clinic at [phone] to:    Ask questions about your health    Make or cancel appointments    Discuss your medicines    Learn about your test results    Speak to your doctor   If you have compliments or concerns about an experience at your clinic, or if you wish to file a complaint, please contact [facility] Patient Relations at [phone] or email us at [email or web address]         Additional Information About Your Visit       "  MyChart Information     [employer] gives you secure access to your electronic health record. If you see a primary care provider, you can also send messages to your care team and make appointments. If you have questions, please call your primary care clinic.  If you do not have a primary care provider, please call [phone] and they will assist you.      [employer] is an electronic gateway that provides easy, online access to your medical records. With [employer], you can request a clinic appointment, read your test results, renew a prescription or communicate with your care team.     To access your existing account, please contact your [facility] or call [phone] for assistance.        Care EveryWhere ID     This is your Care EveryWhere ID. This could be used by other organizations to access your [place] medical records  [id number]        Your Vitals Were     Pulse Temperature Height Pulse Oximetry BMI (Body Mass Index)       120 99.1  F (37.3  C) (Oral) 5' 9.88\" 97% 41.87 kg/m2        Blood Pressure from Last 3 Encounters:   04/26/17 125/78   02/15/17 145/82   10/14/16 132/78    Weight from Last 3 Encounters:   04/26/17 290 lb 12.8 oz (>99 %)*   01/26/15 249 lb 5.4 oz (>99 %)*   08/31/13 274 lb 11.1 oz (>99 %)*     * Growth percentiles are based on [facility] 2-20 Years data.              Today, you had the following     No orders found for display         Today's Medication Changes          These changes are accurate as of: 4/26/17  2:51 PM.  If you have any questions, ask your nurse or doctor.               Start taking these medicines.        Dose/Directions    topiramate 25 MG tablet   Commonly known as:  TOPAMAX   Used for:  Morbid obesity, unspecified obesity type (H)   Started by:  Iovnne Amin PA-C        25mg at bedtime for week 1, 50mg at bedtime for 1 week, and 75mg at bedtime thereafter   Quantity:  90 tablet   Refills:  1            Where to get your medicines    "   These medications were sent to [facility] - [place] [address] [facility] AT [address] [address]     Phone:  [phone]     topiramate 25 MG tablet                Primary Care Provider Office Phone # Fax #    Nicolas Flynn [phone]       [facility] [address]  [facility] [address]        Thank you!     Thank you for choosing [facility] SURGICAL WEIGHT MANAGEMENT  for your care. Our goal is always to provide you with excellent care. Hearing back from our patients is one way we can continue to improve our services. Please take a few minutes to complete the written survey that you may receive in the mail after your visit with us. Thank you!             Your Updated Medication List - Protect others around you: Learn how to safely use, store and throw away your medicines at www.disposemymeds.org.          This list is accurate as of: 4/26/17  2:51 PM.  Always use your most recent med list.                   Brand Name Dispense Instructions for use    ADDERALL PO      Take 30 mg by mouth daily       albuterol 108 (90 BASE) MCG/ACT Inhaler    PROAIR HFA/PROVENTIL HFA/VENTOLIN HFA     Inhale 2 puffs into the lungs every 6 hours       CELEXA PO      Take 20 mg by mouth       SIMVASTATIN PO      Take 10 mg by mouth daily       topiramate 25 MG tablet    TOPAMAX    90 tablet    25mg at bedtime for week 1, 50mg at bedtime for 1 week, and 75mg at bedtime thereafter       TRAZODONE HCL PO          WELLBUTRIN XL PO      Take 300 mg by mouth          none

## 2018-09-15 ENCOUNTER — OFFICE VISIT (OUTPATIENT)
Dept: ENDOCRINOLOGY | Facility: CLINIC | Age: 23
End: 2018-09-15
Payer: MEDICAID

## 2018-09-15 VITALS
DIASTOLIC BLOOD PRESSURE: 76 MMHG | SYSTOLIC BLOOD PRESSURE: 117 MMHG | WEIGHT: 271 LBS | BODY MASS INDEX: 37.94 KG/M2 | OXYGEN SATURATION: 96 % | HEIGHT: 71 IN | HEART RATE: 122 BPM | RESPIRATION RATE: 16 BRPM | TEMPERATURE: 97.6 F

## 2018-09-15 DIAGNOSIS — E66.01 MORBID OBESITY, UNSPECIFIED OBESITY TYPE (H): ICD-10-CM

## 2018-09-15 RX ORDER — TOPIRAMATE 25 MG/1
75 TABLET, FILM COATED ORAL 2 TIMES DAILY
Qty: 180 TABLET | Refills: 3 | Status: SHIPPED | OUTPATIENT
Start: 2018-09-15

## 2018-09-15 RX ORDER — LEVONORGESTREL AND ETHINYL ESTRADIOL 0.15-0.03
KIT ORAL
Refills: 0 | COMMUNITY
Start: 2018-07-25

## 2018-09-15 ASSESSMENT — PAIN SCALES - GENERAL: PAINLEVEL: NO PAIN (0)

## 2018-09-15 NOTE — PROGRESS NOTES
"Return Medical Weight Management Note     Jennifer Okeefe  MRN:  [id number]  :  1995  IDA:  9/15/2018    Dear Nicolas Flynn,    I had the pleasure of seeing your patient Jennifer Okeefe.  She is a 23 year old female who I am continuing to see for treatment of obesity related to:       2017   I have the following co-morbidities associated with obesity: Pre-Diabetes, High Blood Pressure, High Cholesterol, Asthma       INTERVAL HISTORY:  23 y.o. Female here for [facility] follow up.  She is tolerating topiramate 75m BID but still having some nighttime hunger around 8pm.      CURRENT WEIGHT:   271 lbs 0 oz    Wt Readings from Last 4 Encounters:   09/15/18 271 lb   18 276 lb   18 271 lb   17 271 lb 3.2 oz       Height:  5' 11.26\"  Body Mass Index:  Body mass index is 37.52 kg/(m^2).  Vitals:  /76 (BP Location: Left arm, Patient Position: Sitting, Cuff Size: Adult Large)  Pulse 122  Temp 97.6  F (36.4  C) (Oral)  Resp 16  Ht 5' 11.26\"  Wt 271 lb  SpO2 96%  BMI 37.52 kg/m2      Initial consult weight was 290 on 17.  Weight change since last seen on 2018 is down 5 pounds.   Total loss is 19 pounds.    Diet and Activity Changes Since Last Visit Reviewed With Patient 9/15/2018   I have made the following changes to my diet since my last visit: i dont snack as much   With regards to my diet, I am still struggling with: late night eating   With regards to my activity/exercise, I am still struggling with: exercising       MEDICATIONS:   Current Outpatient Prescriptions   Medication     albuterol (PROAIR HFA, PROVENTIL HFA, VENTOLIN HFA) 108 (90 BASE) MCG/ACT inhaler     Amphetamine-Dextroamphetamine (ADDERALL PO)     BuPROPion HCl (WELLBUTRIN XL PO)     Escitalopram Oxalate (LEXAPRO PO)     INTROVALE 0.15-0.03 MG per tablet     NAPROXEN PO     order for DME     SIMVASTATIN PO     topiramate (TOPAMAX) 25 MG tablet     TRAZODONE HCL PO     UNKNOWN TO PATIENT     VITAMIN D, " CHOLECALCIFEROL, PO     No current facility-administered medications for this visit.        Weight Loss Medication History Reviewed With Patient 9/15/2018   Which weight loss medications are you currently taking on a regular basis?  Topamax (topiramate)   Are you having any side effects from the weight loss medication that we have prescribed you? No       ASSESSMENT:   23 y.o. Female here for [facility] follow up.  She has lost 5 lbs since last visit and 19 lbs total.     PLAN:   Topriamate 75mg twice daily and take 2nd dose earlier around 5pm      FOLLOW-UP:    12 weeks.    Time: 15 min spent on evaluation, management, counseling, education, & motivational interviewing with greater than 50 % of the total time was spent on counseling and coordinating care    Sincerely,    Ivonne Amin PA-C

## 2018-09-15 NOTE — LETTER
"9/15/2018     RE: Jennifer Okeefe  [address]  [place] [facility] [place] [facility] [place] [address]     Dear Colleague,    Thank you for referring your patient, Jennifer Okeefe, to the [facility] MEDICAL WEIGHT MANAGEMENT at [facility]. Please see a copy of my visit note below.  Return Medical Weight Management Note     Jennifer Okeefe  MRN:  [id number]  :  1995  IDA:  9/15/2018    Dear Nicolas Flynn,    I had the pleasure of seeing your patient Jennifer Okeefe.  She is a 23 year old female who I am continuing to see for treatment of obesity related to:       2017   I have the following co-morbidities associated with obesity: Pre-Diabetes, High Blood Pressure, High Cholesterol, Asthma       INTERVAL HISTORY:  23 y.o. Female here for [facility] follow up.  She is tolerating topiramate 75m BID but still having some nighttime hunger around 8pm.      CURRENT WEIGHT:   271 lbs 0 oz    Wt Readings from Last 4 Encounters:   09/15/18 271 lb   18 276 lb   18 271 lb   17 271 lb 3.2 oz       Height:  5' 11.26\"  Body Mass Index:  Body mass index is 37.52 kg/(m^2).  Vitals:  /76 (BP Location: Left arm, Patient Position: Sitting, Cuff Size: Adult Large)  Pulse 122  Temp 97.6  F (36.4  C) (Oral)  Resp 16  Ht 5' 11.26\"  Wt 271 lb  SpO2 96%  BMI 37.52 kg/m2      Initial consult weight was 290 on 17.  Weight change since last seen on 2018 is down 5 pounds.   Total loss is 19 pounds.    Diet and Activity Changes Since Last Visit Reviewed With Patient 9/15/2018   I have made the following changes to my diet since my last visit: i dont snack as much   With regards to my diet, I am still struggling with: late night eating   With regards to my activity/exercise, I am still struggling with: exercising       MEDICATIONS:   Current Outpatient Prescriptions   Medication     albuterol (PROAIR HFA, PROVENTIL HFA, VENTOLIN HFA) 108 (90 BASE) MCG/ACT inhaler     " Amphetamine-Dextroamphetamine (ADDERALL PO)     BuPROPion HCl (WELLBUTRIN XL PO)     Escitalopram Oxalate (LEXAPRO PO)     INTROVALE 0.15-0.03 MG per tablet     NAPROXEN PO     order for DME     SIMVASTATIN PO     topiramate (TOPAMAX) 25 MG tablet     TRAZODONE HCL PO     UNKNOWN TO PATIENT     VITAMIN D, CHOLECALCIFEROL, PO     No current facility-administered medications for this visit.        Weight Loss Medication History Reviewed With Patient 9/15/2018   Which weight loss medications are you currently taking on a regular basis?  Topamax (topiramate)   Are you having any side effects from the weight loss medication that we have prescribed you? No       ASSESSMENT:   23 y.o. Female here for [facility] follow up.  She has lost 5 lbs since last visit and 19 lbs total.     PLAN:   Topriamate 75mg twice daily and take 2nd dose earlier around 5pm      FOLLOW-UP:    12 weeks.    Time: 15 min spent on evaluation, management, counseling, education, & motivational interviewing with greater than 50 % of the total time was spent on counseling and coordinating care    Sincerely,    Ivonne Amin PA-C

## 2018-09-15 NOTE — MR AVS SNAPSHOT
"              After Visit Summary   9/15/2018    Jennifer Okeefe    MRN: [id number]           Patient Information     Date Of Birth          1995        Visit Information        Provider Department      9/15/2018 10:30 AM Ivonne Amin PA-C [facility]        Today's Diagnoses     Morbid obesity, unspecified obesity type (H)           Follow-ups after your visit        Who to contact     Please call your clinic at [phone] to:    Ask questions about your health    Make or cancel appointments    Discuss your medicines    Learn about your test results    Speak to your doctor            Additional Information About Your Visit        MyChart Information     [employer] gives you secure access to your electronic health record. If you see a primary care provider, you can also send messages to your care team and make appointments. If you have questions, please call your primary care clinic.  If you do not have a primary care provider, please call [phone] and they will assist you.      [employer] is an electronic gateway that provides easy, online access to your medical records. With [employer], you can request a clinic appointment, read your test results, renew a prescription or communicate with your care team.     To access your existing account, please contact your [facility] or call [phone] for assistance.        Care EveryWhere ID     This is your Care EveryWhere ID. This could be used by other organizations to access your [place] medical records  [id number]        Your Vitals Were     Pulse Temperature Respirations Height Pulse Oximetry BMI (Body Mass Index)    122 97.6  F (36.4  C) (Oral) 16 5' 11.26\" 96% 37.52 kg/m2       Blood Pressure from Last 3 Encounters:   09/15/18 117/76   04/19/18 122/82   01/11/18 119/80    Weight from Last 3 Encounters:   09/15/18 271 lb   04/19/18 276 lb   01/11/18 271 lb              Today, you had the " following     No orders found for display         Where to get your medicines      These medications were sent to [facility] - [place], [place] - [facility] [facility] AT [address] [address]     Phone:  [phone]     topiramate 25 MG tablet          Primary Care Provider Office Phone # Fax #    Nicolas Flynn [phone]       [facility] [address] [facility] [address]  [facility] [address]        Equal Access to Services     KAYLEEN LUX : Hadii aad ku hadasho Soomaali, waaxda luqadaha, qaybta kaalmada adeegyada, waxay idiin hayaan adeeg kharawilber la . So [facility] [phone].    ATENCIÓN: Si habla español, tiene a mann disposición servicios gratuitos de asistencia lingüística. [facility] [phone].    We comply with applicable federal civil rights laws and [place] laws. We do not discriminate on the basis of race, color, national origin, age, disability, sex, sexual orientation, or gender identity.            Thank you!     Thank you for choosing [facility] MEDICAL WEIGHT MANAGEMENT  for your care. Our goal is always to provide you with excellent care. Hearing back from our patients is one way we can continue to improve our services. Please take a few minutes to complete the written survey that you may receive in the mail after your visit with us. Thank you!             Your Updated Medication List - Protect others around you: Learn how to safely use, store and throw away your medicines at www.disposemymeds.org.          This list is accurate as of 9/15/18 10:32 AM.  Always use your most recent med list.                   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

## 2018-09-15 NOTE — NURSING NOTE
"Chief Complaint   Patient presents with     Weight Problem     F/U for Medical Weight Management       Vitals:    09/15/18 1018   BP: 117/76   BP Location: Left arm   Patient Position: Sitting   Cuff Size: Adult Large   Pulse: 122   Resp: 16   Temp: 97.6  F (36.4  C)   TempSrc: Oral   SpO2: 96%   Weight: 122.9 kg (271 lb)   Height: 1.81 m (5' 11.26\")       Body mass index is 37.52 kg/(m^2).      Sarah Fierro LPN                          "

## 2018-09-29 ENCOUNTER — HEALTH MAINTENANCE LETTER (OUTPATIENT)
Age: 23
End: 2018-09-29

## 2018-12-06 ENCOUNTER — OFFICE VISIT (OUTPATIENT)
Dept: ENDOCRINOLOGY | Facility: CLINIC | Age: 23
End: 2018-12-06
Payer: MEDICAID

## 2018-12-06 VITALS
SYSTOLIC BLOOD PRESSURE: 124 MMHG | DIASTOLIC BLOOD PRESSURE: 77 MMHG | WEIGHT: 277.4 LBS | HEART RATE: 100 BPM | HEIGHT: 71 IN | OXYGEN SATURATION: 99 % | TEMPERATURE: 97.6 F | BODY MASS INDEX: 38.84 KG/M2

## 2018-12-06 DIAGNOSIS — E66.01 MORBID OBESITY, UNSPECIFIED OBESITY TYPE (H): Primary | ICD-10-CM

## 2018-12-06 RX ORDER — CIDER VINEGAR 300 MG
TABLET ORAL
COMMUNITY

## 2018-12-06 ASSESSMENT — PAIN SCALES - GENERAL: PAINLEVEL: NO PAIN (0)

## 2018-12-06 NOTE — NURSING NOTE
"Chief Complaint   Patient presents with     Weight Check     Return weight management.        Vitals:    12/06/18 1334   BP: 124/77   BP Location: Left arm   Patient Position: Chair   Cuff Size: Adult Large   Pulse: 100   Temp: 97.6  F (36.4  C)   TempSrc: Oral   SpO2: 99%   Weight: 277 lb 6.4 oz   Height: 5' 11.26\"       Body mass index is 38.41 kg/(m^2).  Regulo MOORE LPN                     "

## 2018-12-06 NOTE — MR AVS SNAPSHOT
"              After Visit Summary   12/6/2018    Jennifer Okeefe    MRN: [id number]           Patient Information     Date Of Birth          1995        Visit Information        Provider Department      12/6/2018 2:00 PM Ivonne Amin PA-C [facility] Medical Weight Management         Follow-ups after your visit        Who to contact     Please call your clinic at [phone] to:    Ask questions about your health    Make or cancel appointments    Discuss your medicines    Learn about your test results    Speak to your doctor            Additional Information About Your Visit        MyChart Information     [employer] gives you secure access to your electronic health record. If you see a primary care provider, you can also send messages to your care team and make appointments. If you have questions, please call your primary care clinic.  If you do not have a primary care provider, please call [phone] and they will assist you.      [employer] is an electronic gateway that provides easy, online access to your medical records. With [employer], you can request a clinic appointment, read your test results, renew a prescription or communicate with your care team.     To access your existing account, please contact your [facility] or call [phone] for assistance.        Care EveryWhere ID     This is your Care EveryWhere ID. This could be used by other organizations to access your [place] medical records  [id number]        Your Vitals Were     Pulse Temperature Height Pulse Oximetry BMI (Body Mass Index)       100 97.6  F (36.4  C) (Oral) 5' 11.26\" 99% 38.41 kg/m2        Blood Pressure from Last 3 Encounters:   12/06/18 124/77   09/15/18 117/76   04/19/18 122/82    Weight from Last 3 Encounters:   12/06/18 277 lb 6.4 oz   09/15/18 271 lb   04/19/18 276 lb              Today, you had the following     No orders found for display       Primary Care Provider Office Phone # Fax # "    Nicolas Flynn [phone]       [facility] [place] [facility] [address] [facility] [address]  [place] [place] [facility] [address]        Equal Access to Services     KAYLEEN LUX : Hadii aad ku hadgabrielimani Sokevenali, wafarheenda luqadaha, qaybta kaalmada martin, julian gradyin hayaaerma mooreronaldo canales bessie dial. So [facility] [phone].    ATENCIÓN: Si habla español, tiene a mann disposición servicios gratuitos de asistencia lingüística. Llame al [phone].    We comply with applicable federal civil rights laws and [place] laws. We do not discriminate on the basis of race, color, national origin, age, disability, sex, sexual orientation, or gender identity.            Thank you!     Thank you for choosing [facility] WEIGHT MANAGEMENT  for your care. Our goal is always to provide you with excellent care. Hearing back from our patients is one way we can continue to improve our services. Please take a few minutes to complete the written survey that you may receive in the mail after your visit with us. Thank you!             Your Updated Medication List - Protect others around you: Learn how to safely use, store and throw away your medicines at www.disposemymeds.org.          This list is accurate as of 12/6/18  2:40 PM.  Always use your most recent med list.                   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

## 2018-12-06 NOTE — LETTER
"2018       RE: Jennifer Okeefe  [address]  [facility] [address]     Dear Colleague,    Thank you for referring your patient, Jennifer Okeefe, to the [facility] MEDICAL WEIGHT MANAGEMENT at [facility]. Please see a copy of my visit note below.    Return Medical Weight Management Note     Jennifer Okeefe  MRN:  [id number]  :  1995  IDA:  2018    Dear Nicolas Flynn,    I had the pleasure of seeing your patient Jennifer Okeefe.  She is a 23 year old female who I am continuing to see for treatment of obesity related to:       2017   I have the following co-morbidities associated with obesity: Pre-Diabetes, High Blood Pressure, High Cholesterol, Asthma       INTERVAL HISTORY:  23 y.o. Female here for [facility] follow up.  She is up 6 lbs since last visit.  Last visit we increased topiramate to 75mg BID and moved up the time of the 2nd dose.    CURRENT WEIGHT:   277 lbs 6.4 oz    Wt Readings from Last 4 Encounters:   18 277 lb 6.4 oz   09/15/18 271 lb   18 276 lb   18 271 lb       Height:  5' 11.26\"  Body Mass Index:  Body mass index is 38.41 kg/(m^2).  Vitals:  /77 (BP Location: Left arm, Patient Position: Chair, Cuff Size: Adult Large)  Pulse 100  Temp 97.6  F (36.4  C) (Oral)  Ht 5' 11.26\"  Wt 277 lb 6.4 oz  SpO2 99%  BMI 38.41 kg/m2      Initial consult weight was 290 on 17.  Weight change since last seen on 9/15/2018 is up 6 pounds.   Total loss is 13 pounds.    Diet and Activity Changes Since Last Visit Reviewed With Patient 2018   I have made the following changes to my diet since my last visit: not as much snacking   With regards to my diet, I am still struggling with: sometimes not getting one full meal   I have made the following changes to my activity/exercise since my last visit: none   With regards to my activity/exercise, I am still struggling with: physical activity       MEDICATIONS:   Current " Outpatient Prescriptions   Medication     Amphetamine-Dextroamphetamine (ADDERALL PO)     BuPROPion HCl (WELLBUTRIN XL PO)     Escitalopram Oxalate (LEXAPRO PO)     INTROVALE 0.15-0.03 MG per tablet     NAPROXEN PO     Omega-3 Fatty Acids (FISH OIL) 1000 MG CPDR     order for DME     SIMVASTATIN PO     topiramate (TOPAMAX) 25 MG tablet     TRAZODONE HCL PO     albuterol (PROAIR HFA, PROVENTIL HFA, VENTOLIN HFA) 108 (90 BASE) MCG/ACT inhaler     VITAMIN D, CHOLECALCIFEROL, PO     No current facility-administered medications for this visit.        Weight Loss Medication History Reviewed With Patient 12/6/2018   Which weight loss medications are you currently taking on a regular basis?  Topamax (topiramate)   Are you having any side effects from the weight loss medication that we have prescribed you? No       ASSESSMENT:   23  y.o. Female here for [facility] follow up. She had lost 19 lbs with topiramate but recently gained 6 lbs.  She often forgets to take second dose of topiramate.     PLAN:   Set time on phone and mom's phone for second dose of topiramate    FOLLOW-UP:    12 weeks.    Time: 15 min spent on evaluation, management, counseling, education, & motivational interviewing with greater than 50 % of the total time was spent on counseling and coordinating care      Sincerely,    Ivonne Amin PA-C    Again, thank you for allowing me to participate in the care of your patient.      Sincerely,    Ivonne Amin PA-C

## 2018-12-06 NOTE — PROGRESS NOTES
"Return Medical Weight Management Note     Jennifer Okeefe  MRN:  [id number]  :  1995  IDA:  2018    Dear Nicolas Flynn,    I had the pleasure of seeing your patient Jennifer Okeefe.  She is a 23 year old female who I am continuing to see for treatment of obesity related to:       2017   I have the following co-morbidities associated with obesity: Pre-Diabetes, High Blood Pressure, High Cholesterol, Asthma       INTERVAL HISTORY:  23 y.o. Female here for [facility] follow up.  She is up 6 lbs since last visit.  Last visit we increased topiramate to 75mg BID and moved up the time of the 2nd dose.    CURRENT WEIGHT:   277 lbs 6.4 oz    Wt Readings from Last 4 Encounters:   18 277 lb 6.4 oz   09/15/18 271 lb   18 276 lb   18 271 lb       Height:  5' 11.26\"  Body Mass Index:  Body mass index is 38.41 kg/(m^2).  Vitals:  /77 (BP Location: Left arm, Patient Position: Chair, Cuff Size: Adult Large)  Pulse 100  Temp 97.6  F (36.4  C) (Oral)  Ht 5' 11.26\"  Wt 277 lb 6.4 oz  SpO2 99%  BMI 38.41 kg/m2      Initial consult weight was 290 on 17.  Weight change since last seen on 9/15/2018 is up 6 pounds.   Total loss is 13 pounds.    Diet and Activity Changes Since Last Visit Reviewed With Patient 2018   I have made the following changes to my diet since my last visit: not as much snacking   With regards to my diet, I am still struggling with: sometimes not getting one full meal   I have made the following changes to my activity/exercise since my last visit: none   With regards to my activity/exercise, I am still struggling with: physical activity       MEDICATIONS:   Current Outpatient Prescriptions   Medication     Amphetamine-Dextroamphetamine (ADDERALL PO)     BuPROPion HCl (WELLBUTRIN XL PO)     Escitalopram Oxalate (LEXAPRO PO)     INTROVALE 0.15-0.03 MG per tablet     NAPROXEN PO     Omega-3 Fatty Acids (FISH OIL) 1000 MG CPDR     order for DME     SIMVASTATIN PO     " topiramate (TOPAMAX) 25 MG tablet     TRAZODONE HCL PO     albuterol (PROAIR HFA, PROVENTIL HFA, VENTOLIN HFA) 108 (90 BASE) MCG/ACT inhaler     VITAMIN D, CHOLECALCIFEROL, PO     No current facility-administered medications for this visit.        Weight Loss Medication History Reviewed With Patient 12/6/2018   Which weight loss medications are you currently taking on a regular basis?  Topamax (topiramate)   Are you having any side effects from the weight loss medication that we have prescribed you? No       ASSESSMENT:   23  y.o. Female here for [facility] follow up. She had lost 19 lbs with topiramate but recently gained 6 lbs.  She often forgets to take second dose of topiramate.     PLAN:   Set time on phone and mom's phone for second dose of topiramate    FOLLOW-UP:    12 weeks.    Time: 15 min spent on evaluation, management, counseling, education, & motivational interviewing with greater than 50 % of the total time was spent on counseling and coordinating care      Sincerely,    Ivonne Amin PA-C

## 2019-10-01 ENCOUNTER — HEALTH MAINTENANCE LETTER (OUTPATIENT)
Age: 24
End: 2019-10-01

## 2021-01-15 ENCOUNTER — HEALTH MAINTENANCE LETTER (OUTPATIENT)
Age: 26
End: 2021-01-15

## 2021-09-04 ENCOUNTER — HEALTH MAINTENANCE LETTER (OUTPATIENT)
Age: 26
End: 2021-09-04

## 2022-02-19 ENCOUNTER — HEALTH MAINTENANCE LETTER (OUTPATIENT)
Age: 27
End: 2022-02-19

## 2022-06-10 ENCOUNTER — NURSE TRIAGE (OUTPATIENT)
Dept: NURSING | Facility: CLINIC | Age: 27
End: 2022-06-10
Payer: MEDICAID

## 2022-06-10 LAB
ANION GAP SERPL CALCULATED.3IONS-SCNC: 4 MMOL/L (ref 3–14)
BASOPHILS # BLD AUTO: 0 10E3/UL (ref 0–0.2)
BASOPHILS NFR BLD AUTO: 0 %
BUN SERPL-MCNC: 6 MG/DL (ref 7–30)
CALCIUM SERPL-MCNC: 9 MG/DL (ref 8.5–10.1)
CHLORIDE BLD-SCNC: 105 MMOL/L (ref 94–109)
CO2 SERPL-SCNC: 30 MMOL/L (ref 20–32)
CREAT SERPL-MCNC: 0.75 MG/DL (ref 0.52–1.04)
EOSINOPHIL # BLD AUTO: 0 10E3/UL (ref 0–0.7)
EOSINOPHIL NFR BLD AUTO: 0 %
ERYTHROCYTE [DISTWIDTH] IN BLOOD BY AUTOMATED COUNT: 12.5 % (ref 10–15)
GFR SERPL CREATININE-BSD FRML MDRD: >90 ML/MIN/1.73M2
GLUCOSE BLD-MCNC: 126 MG/DL (ref 70–99)
HCT VFR BLD AUTO: 43.8 % (ref 35–47)
HGB BLD-MCNC: 13.9 G/DL (ref 11.7–15.7)
IMM GRANULOCYTES # BLD: 0.1 10E3/UL
IMM GRANULOCYTES NFR BLD: 1 %
LACTATE SERPL-SCNC: 1.4 MMOL/L (ref 0.7–2)
LYMPHOCYTES # BLD AUTO: 0.8 10E3/UL (ref 0.8–5.3)
LYMPHOCYTES NFR BLD AUTO: 8 %
MCH RBC QN AUTO: 29.4 PG (ref 26.5–33)
MCHC RBC AUTO-ENTMCNC: 31.7 G/DL (ref 31.5–36.5)
MCV RBC AUTO: 93 FL (ref 78–100)
MONOCYTES # BLD AUTO: 0.5 10E3/UL (ref 0–1.3)
MONOCYTES NFR BLD AUTO: 5 %
NEUTROPHILS # BLD AUTO: 7.9 10E3/UL (ref 1.6–8.3)
NEUTROPHILS NFR BLD AUTO: 86 %
NRBC # BLD AUTO: 0 10E3/UL
NRBC BLD AUTO-RTO: 0 /100
PLATELET # BLD AUTO: 215 10E3/UL (ref 150–450)
POTASSIUM BLD-SCNC: 4.2 MMOL/L (ref 3.4–5.3)
RBC # BLD AUTO: 4.72 10E6/UL (ref 3.8–5.2)
SODIUM SERPL-SCNC: 139 MMOL/L (ref 133–144)
WBC # BLD AUTO: 9.1 10E3/UL (ref 4–11)

## 2022-06-10 PROCEDURE — 83605 ASSAY OF LACTIC ACID: CPT | Performed by: EMERGENCY MEDICINE

## 2022-06-10 PROCEDURE — 99284 EMERGENCY DEPT VISIT MOD MDM: CPT | Performed by: EMERGENCY MEDICINE

## 2022-06-10 PROCEDURE — 85025 COMPLETE CBC W/AUTO DIFF WBC: CPT | Performed by: EMERGENCY MEDICINE

## 2022-06-10 PROCEDURE — 250N000013 HC RX MED GY IP 250 OP 250 PS 637: Performed by: EMERGENCY MEDICINE

## 2022-06-10 PROCEDURE — 80048 BASIC METABOLIC PNL TOTAL CA: CPT | Performed by: EMERGENCY MEDICINE

## 2022-06-10 PROCEDURE — 99283 EMERGENCY DEPT VISIT LOW MDM: CPT | Performed by: EMERGENCY MEDICINE

## 2022-06-10 PROCEDURE — 36415 COLL VENOUS BLD VENIPUNCTURE: CPT | Performed by: EMERGENCY MEDICINE

## 2022-06-10 RX ORDER — ACETAMINOPHEN 500 MG
1000 TABLET ORAL ONCE
Status: COMPLETED | OUTPATIENT
Start: 2022-06-10 | End: 2022-06-10

## 2022-06-10 RX ADMIN — ACETAMINOPHEN 1000 MG: 500 TABLET ORAL at 22:47

## 2022-06-11 ENCOUNTER — HOSPITAL ENCOUNTER (EMERGENCY)
Facility: CLINIC | Age: 27
Discharge: HOME | End: 2022-06-11
Attending: EMERGENCY MEDICINE | Admitting: EMERGENCY MEDICINE
Payer: MEDICAID

## 2022-06-11 VITALS
TEMPERATURE: 98 F | HEART RATE: 113 BPM | RESPIRATION RATE: 18 BRPM | OXYGEN SATURATION: 98 % | SYSTOLIC BLOOD PRESSURE: 121 MMHG | DIASTOLIC BLOOD PRESSURE: 78 MMHG

## 2022-06-11 DIAGNOSIS — R50.9 FEVER, UNSPECIFIED FEVER CAUSE: ICD-10-CM

## 2022-06-11 LAB
HOLD SPECIMEN: NORMAL

## 2022-06-11 PROCEDURE — 250N000013 HC RX MED GY IP 250 OP 250 PS 637: Performed by: EMERGENCY MEDICINE

## 2022-06-11 RX ORDER — IBUPROFEN 600 MG/1
600 TABLET, FILM COATED ORAL ONCE
Status: COMPLETED | OUTPATIENT
Start: 2022-06-11 | End: 2022-06-11

## 2022-06-11 RX ADMIN — IBUPROFEN 600 MG: 600 TABLET ORAL at 05:17

## 2022-06-11 ASSESSMENT — ENCOUNTER SYMPTOMS
DYSURIA: 0
WEAKNESS: 1
CONFUSION: 0
DIZZINESS: 1
ABDOMINAL PAIN: 0
VOMITING: 0
HEADACHES: 1
FEVER: 1
CHILLS: 1
BACK PAIN: 0
RHINORRHEA: 0
BRUISES/BLEEDS EASILY: 0
COUGH: 1
NAUSEA: 0

## 2022-06-11 NOTE — ED PROVIDER NOTES
ED Provider Note  [facility]      History     Chief Complaint   Patient presents with     Fever     Pt tested +COVID, pt endorsing generalized body malaise, headache and fever of 103 around 8pm, feeling dizzy. Took Tylenol around 7pm     HPI  Jennifer Okeefe is a 26 year old female who presents to the emergency department from home for evaluation of fever.  Patient reports fever, chills, cough that started on Thursday night.  Patient reports she took a home COVID test which was positive today.  Patient complains of generalized body aches, weakness, headache, fever, chills, cough with occasional sputum production.  Patient denies any chest pain, shortness of breath, abdominal pain, nausea, vomiting.  Patient took Tylenol around 7 PM.  No other complaints.    Past Medical History  Past Medical History:   Diagnosis Date     ADHD (attention deficit hyperactivity disorder)      Depressive disorder      Uncomplicated asthma      History reviewed. No pertinent surgical history.  albuterol (PROAIR HFA, PROVENTIL HFA, VENTOLIN HFA) 108 (90 BASE) MCG/ACT inhaler  Amphetamine-Dextroamphetamine (ADDERALL PO)  BuPROPion HCl (WELLBUTRIN XL PO)  Escitalopram Oxalate (LEXAPRO PO)  NAPROXEN PO  Omega-3 Fatty Acids (FISH OIL) 1000 MG CPDR  SIMVASTATIN PO  topiramate (TOPAMAX) 25 MG tablet  TRAZODONE HCL PO  VITAMIN D, CHOLECALCIFEROL, PO  INTROVALE 0.15-0.03 MG per tablet  order for DME      No Known Allergies  Family History  Family History   Problem Relation Age of Onset     Diabetes Paternal Grandmother      Hypertension Paternal Grandmother      Cerebrovascular Disease Paternal Grandmother      Diabetes Other      Hyperlipidemia Father      Depression Mother      Anxiety Disorder Mother      Social History   Social History     Tobacco Use     Smoking status: Never Smoker     Smokeless tobacco: Never Used   Substance Use Topics     Alcohol use: No     Drug use: No      Past medical history, past  surgical history, medications, allergies, family history, and social history were reviewed with the patient. No additional pertinent items.       Review of Systems   Constitutional: Positive for chills and fever.   HENT: Negative for rhinorrhea.    Eyes: Negative for visual disturbance.   Respiratory: Positive for cough.    Cardiovascular: Negative for chest pain and leg swelling.   Gastrointestinal: Negative for abdominal pain, nausea and vomiting.   Endocrine: Negative for polyuria.   Genitourinary: Negative for dysuria.   Musculoskeletal: Negative for back pain.   Skin: Negative for rash.   Allergic/Immunologic: Negative for immunocompromised state.   Neurological: Positive for dizziness, weakness and headaches.   Hematological: Does not bruise/bleed easily.   Psychiatric/Behavioral: Negative for confusion.     A complete review of systems was performed with pertinent positives and negatives noted in the HPI, and all other systems negative.    Physical Exam   BP: 121/78  Pulse: 113  Temp: (!) 102  F (38.9  C)  Resp: 18  SpO2: 98 %  Physical Exam  General: Febrile, no acute distress   HEENT: Normocephalic, atraumatic, conjunctivae normal. MMM  Neck: non-tender, supple  Cardio: regular rate. regular rhythm   Resp: Normal work of breathing, no respiratory distress, lungs clear bilaterally, no wheezing, rhonchi, rales  Chest/Back: no visual signs of trauma, no CVA tenderness   Abdomen: soft, non distension, no tenderness, no peritoneal signs   Neuro: alert and fully oriented. CN II-XII grossly intact. Grossly normal strength and sensation in all extremities.   MSK: no deformities. Normal range of motion  Integumentary/Skin: no rash visualized, normal color  Psych: normal affect, normal behavior    ED Course      Procedures    Results for orders placed or performed during the hospital encounter of 06/11/22   Lactic acid whole blood STAT     Status: Normal   Result Value Ref Range    Lactic Acid 1.4 0.7 - 2.0 mmol/L    Basic metabolic panel     Status: Abnormal   Result Value Ref Range    Sodium 139 133 - 144 mmol/L    Potassium 4.2 3.4 - 5.3 mmol/L    Chloride 105 94 - 109 mmol/L    Carbon Dioxide (CO2) 30 20 - 32 mmol/L    Anion Gap 4 3 - 14 mmol/L    Urea Nitrogen 6 (L) 7 - 30 mg/dL    Creatinine 0.75 0.52 - 1.04 mg/dL    Calcium 9.0 8.5 - 10.1 mg/dL    Glucose 126 (H) 70 - 99 mg/dL    GFR Estimate >90 >60 mL/min/1.73m2   [place] Draw     Status: None    Narrative    The following orders were created for panel order [place] Draw.  Procedure                               Abnormality         Status                     ---------                               -----------         ------                     Extra Red Top Tube[[id number]]                               Final result               Extra Green Top (Lithium...[[id number]]                      Final result               Extra Purple Top Tube[[id number]]                            Final result                 Please view results for these tests on the individual orders.   CBC with platelets and differential     Status: None   Result Value Ref Range    WBC Count 9.1 4.0 - 11.0 10e3/uL    RBC Count 4.72 3.80 - 5.20 10e6/uL    Hemoglobin 13.9 11.7 - 15.7 g/dL    Hematocrit 43.8 35.0 - 47.0 %    MCV 93 78 - 100 fL    MCH 29.4 26.5 - 33.0 pg    MCHC 31.7 31.5 - 36.5 g/dL    RDW 12.5 10.0 - 15.0 %    Platelet Count 215 150 - 450 10e3/uL    % Neutrophils 86 %    % Lymphocytes 8 %    % Monocytes 5 %    % Eosinophils 0 %    % Basophils 0 %    % Immature Granulocytes 1 %    NRBCs per 100 WBC 0 <1 /100    Absolute Neutrophils 7.9 1.6 - 8.3 10e3/uL    Absolute Lymphocytes 0.8 0.8 - 5.3 10e3/uL    Absolute Monocytes 0.5 0.0 - 1.3 10e3/uL    Absolute Eosinophils 0.0 0.0 - 0.7 10e3/uL    Absolute Basophils 0.0 0.0 - 0.2 10e3/uL    Absolute Immature Granulocytes 0.1 <=0.4 10e3/uL    Absolute NRBCs 0.0 10e3/uL   Extra Red Top Tube     Status: None   Result Value Ref Range    Hold Specimen JIC     Extra Green Top (Lithium Heparin) Tube     Status: None   Result Value Ref Range    Hold Specimen JIC    Extra Purple Top Tube     Status: None   Result Value Ref Range    Hold Specimen JIC    CBC with platelets differential     Status: None    Narrative    The following orders were created for panel order CBC with platelets differential.  Procedure                               Abnormality         Status                     ---------                               -----------         ------                     CBC with platelets and d...[[id number]]                      Final result                 Please view results for these tests on the individual orders.     Medications   acetaminophen (TYLENOL) tablet 1,000 mg (1,000 mg Oral Given 6/10/22 3648)   ibuprofen (ADVIL/MOTRIN) tablet 600 mg (600 mg Oral Given 6/11/22 0517)        Assessments & Plan (with Medical Decision Making)   Jennifer Okeefe is a 26 year old female who presents to the emergency department from home for evaluation of fever.  Upon arrival patient is febrile with a temperature of 38.9, mildly tachycardic with a heart rate of 113, blood pressure 121/70, oxygen 98% on room air.  Patient with home COVID test positive today , here with fever, chills, generalized body aches.  Patient given Tylenol upon arrival with improvement of her symptoms.  Comprehensive labs were performed and reviewed which were unremarkable with no leukocytosis, no acute metabolic or electrolyte abnormality, normal lactic acid.  Patient in no respiratory distress, no shortness of breath, oxygen 90% on room air.  I suspect patient's symptoms are related to COVID-19.  Discussed with patient and mother to continue supportive care with rest, oral hydration, Tylenol and ibuprofen as needed for fever, chills, body aches.  Encourage close outpatient follow-up and return precautions discussed if persistent high fever, difficulty breathing, chest pain, new worsening symptoms.  Patient  understands and agrees with plan.    I have reviewed the nursing notes. I have reviewed the findings, diagnosis, plan and need for follow up with the patient.    New Prescriptions    No medications on file       Final diagnoses:   Fever, unspecified fever cause       --  Nurys Dixon MD  [facility], located within [facility] EMERGENCY DEPARTMENT  6/10/2022     Nurys Dixon MD  06/11/22 0569

## 2022-06-11 NOTE — DISCHARGE INSTRUCTIONS
Thank you for your patience today.  Please follow-up with your regular doctor in the next 2-3 days for further evaluation and follow-up care.  Please call to schedule an appointment.  Please continue your own medications.  Please alternate taking Tylenol and ibuprofen as needed for fever, body aches.  Please make sure you are getting plenty of rest, drinking plenty of fluids.  Please return to the ER if you develop persistent high fever, difficulty breathing, chest pain, or any worsening of your current symptoms.  It was a pleasure taking care of you today.  We hope you feel better soon.

## 2022-06-11 NOTE — TELEPHONE ENCOUNTER
Pt's mom called stating pt did a covid 19 home test and she is positive. She reported pt has fever, 102.7, is a slightly dizzy, chills and feeling lethargic. Mom reported pt woke up in the middle of the night and her symptoms started, and three hours ago pt temp was 104, tylenol was given. Mom check pt temp during the call and obtain two readings, 102.7, and 103.7. Pt's pulse is 110, 02 between 91 to 94%. Pt denied shortness of breath.      Per [employer] pt was advised to be seen within 4 hours and mom stated okay and planing to take pt to the ER.        Frank Manning RN  [facility]       COVID 19 Nurse Triage Plan/Patient Instructions    Please be aware that novel coronavirus (COVID-19) may be circulating in the community. If you develop symptoms such as fever, cough, or SOB or if you have concerns about the presence of another infection including coronavirus (COVID-19), please contact your health care provider or visit [email or web address].     Disposition/Instructions    In-Person Visit with provider recommended. Reference Visit Selection Guide.    Thank you for taking steps to prevent the spread of this virus.  o Limit your contact with others.  o Wear a simple mask to cover your cough.  o Wash your hands well and often.    Resources    [facility]: About COVID-19: [email or web address]    CDC: What to Do If You're Sick: www.cdc.gov/coronavirus/2019-ncov/about/steps-when-sick.html    CDC: Ending Home Isolation: www.cdc.gov/coronavirus/2019-ncov/hcp/disposition-in-home-patients.html     CDC: Caring for Someone: www.cdc.gov/coronavirus/2019-ncov/if-you-are-sick/care-for-someone.html     [facility]: Interim Guidance for Hospital Discharge to Home: www.health.[facility].[place]/diseases/coronavirus/hcp/hospdischarge.pdf    [facility] clinical trials (COVID-19 research studies): clinicalaffairs.[facility]-clinical-trials     Below are the COVID-19 hotlines at the  [place] [facility] ([facility]). Interpreters are available.   o For health questions: Call 686-304-5241 or 1-987.983.7078 (7 a.m. to 7 p.m.)  o For questions about schools and childcare: Call 964-355-7441 or 1-576.233.3449 (7 a.m. to 7 p.m.)       Reason for Disposition    Fever > 103 F (39.4 C)    Additional Information    Negative: SEVERE difficulty breathing (e.g., struggling for each breath, speaks in single words)    Negative: Difficult to awaken or acting confused (e.g., disoriented, slurred speech)    Negative: Bluish (or gray) lips or face now    Negative: Shock suspected (e.g., cold/pale/clammy skin, too weak to stand, low BP, rapid pulse)    Negative: Sounds like a life-threatening emergency to the triager    Negative: [1] Diagnosed or suspected COVID-19 AND [2] symptoms lasting 3 or more weeks    Negative: [1] COVID-19 exposure AND [2] no symptoms    Negative: COVID-19 vaccine reaction suspected (e.g., fever, headache, muscle aches) occurring 1 to 3 days after getting vaccine    Negative: COVID-19 vaccine, questions about    Negative: [1] Lives with someone known to have influenza (flu test positive) AND [2] flu-like symptoms (e.g., cough, runny nose, sore throat, SOB; with or without fever)    Negative: [1] Adult with possible COVID-19 symptoms AND [2] triager concerned about severity of symptoms or other causes    Negative: COVID-19 and breastfeeding, questions about    Negative: SEVERE or constant chest pain or pressure  (Exception: Mild central chest pain, present only when coughing.)    Negative: MODERATE difficulty breathing (e.g., speaks in phrases, SOB even at rest, pulse 100-120)    Negative: [1] Headache AND [2] stiff neck (can't touch chin to chest)    Negative: Oxygen level (e.g., pulse oximetry) 90 percent or lower    Negative: Chest pain or pressure    Negative: Patient sounds very sick or weak to the triager    Negative: MILD difficulty breathing (e.g., minimal/no SOB at rest, SOB  with walking, pulse <100)    Protocols used: CORONAVIRUS (COVID-19) DIAGNOSED OR [id number] 1.18.2022

## 2022-10-22 ENCOUNTER — HEALTH MAINTENANCE LETTER (OUTPATIENT)
Age: 27
End: 2022-10-22

## 2023-04-01 ENCOUNTER — HEALTH MAINTENANCE LETTER (OUTPATIENT)
Age: 28
End: 2023-04-01

## 2024-06-01 ENCOUNTER — HEALTH MAINTENANCE LETTER (OUTPATIENT)
Age: 29
End: 2024-06-01

## 2025-05-30 ENCOUNTER — HOSPITAL ENCOUNTER (INPATIENT)
Facility: CLINIC | Age: 30
LOS: 2 days | Discharge: HOME | End: 2025-06-01
Attending: STUDENT IN AN ORGANIZED HEALTH CARE EDUCATION/TRAINING PROGRAM | Admitting: INTERNAL MEDICINE
Payer: MEDICAID

## 2025-05-30 ENCOUNTER — APPOINTMENT (OUTPATIENT)
Dept: CT IMAGING | Facility: CLINIC | Age: 30
End: 2025-05-30
Attending: STUDENT IN AN ORGANIZED HEALTH CARE EDUCATION/TRAINING PROGRAM
Payer: MEDICAID

## 2025-05-30 DIAGNOSIS — N17.9 ACUTE RENAL FAILURE, UNSPECIFIED ACUTE RENAL FAILURE TYPE: ICD-10-CM

## 2025-05-30 DIAGNOSIS — R19.7 NAUSEA VOMITING AND DIARRHEA: ICD-10-CM

## 2025-05-30 DIAGNOSIS — R94.31 PROLONGED Q-T INTERVAL ON ECG: ICD-10-CM

## 2025-05-30 DIAGNOSIS — R11.2 NAUSEA VOMITING AND DIARRHEA: ICD-10-CM

## 2025-05-30 DIAGNOSIS — K59.03 DRUG-INDUCED CONSTIPATION: Primary | ICD-10-CM

## 2025-05-30 LAB
ABO + RH BLD: NORMAL
ADV 40+41 DNA STL QL NAA+NON-PROBE: NEGATIVE
ALBUMIN SERPL BCG-MCNC: 4.3 G/DL (ref 3.5–5.2)
ALBUMIN UR-MCNC: 30 MG/DL
ALP SERPL-CCNC: 261 U/L (ref 40–150)
ALT SERPL W P-5'-P-CCNC: 18 U/L (ref 0–50)
ANION GAP SERPL CALCULATED.3IONS-SCNC: 17 MMOL/L (ref 7–15)
ANION GAP SERPL CALCULATED.3IONS-SCNC: 20 MMOL/L (ref 7–15)
APPEARANCE UR: ABNORMAL
AST SERPL W P-5'-P-CCNC: 12 U/L (ref 0–45)
ASTRO TYP 1-8 RNA STL QL NAA+NON-PROBE: NEGATIVE
ATRIAL RATE - MUSE: 115 BPM
BASE EXCESS BLDV CALC-SCNC: -5 MMOL/L (ref -3–3)
BASE EXCESS BLDV CALC-SCNC: -6 MMOL/L (ref -3–3)
BASOPHILS # BLD AUTO: 0.1 10E3/UL (ref 0–0.2)
BASOPHILS NFR BLD AUTO: 1 %
BILIRUB SERPL-MCNC: 0.7 MG/DL
BILIRUB UR QL STRIP: NEGATIVE
BLD GP AB SCN SERPL QL: NEGATIVE
BUN SERPL-MCNC: 13.3 MG/DL (ref 6–20)
BUN SERPL-MCNC: 14.7 MG/DL (ref 6–20)
C CAYETANENSIS DNA STL QL NAA+NON-PROBE: NEGATIVE
C DIFF TOX B STL QL: NEGATIVE
CALCIUM SERPL-MCNC: 10 MG/DL (ref 8.8–10.4)
CALCIUM SERPL-MCNC: 8.9 MG/DL (ref 8.8–10.4)
CAMPYLOBACTER DNA SPEC NAA+PROBE: NEGATIVE
CHLORIDE SERPL-SCNC: 102 MMOL/L (ref 98–107)
CHLORIDE SERPL-SCNC: 95 MMOL/L (ref 98–107)
COLOR UR AUTO: YELLOW
CPB POCT: NO
CREAT SERPL-MCNC: 2.67 MG/DL (ref 0.51–0.95)
CREAT SERPL-MCNC: 3.96 MG/DL (ref 0.51–0.95)
CREAT UR-MCNC: 551.7 MG/DL
CRYPTOSP DNA STL QL NAA+NON-PROBE: NEGATIVE
DIASTOLIC BLOOD PRESSURE - MUSE: NORMAL MMHG
E COLI O157 DNA STL QL NAA+NON-PROBE: NORMAL
E HISTOLYT DNA STL QL NAA+NON-PROBE: NEGATIVE
EAEC ASTA GENE ISLT QL NAA+PROBE: NEGATIVE
EC STX1+STX2 GENES STL QL NAA+NON-PROBE: NEGATIVE
EGFRCR SERPLBLD CKD-EPI 2021: 15 ML/MIN/1.73M2
EGFRCR SERPLBLD CKD-EPI 2021: 24 ML/MIN/1.73M2
EOSINOPHIL # BLD AUTO: 0 10E3/UL (ref 0–0.7)
EOSINOPHIL NFR BLD AUTO: 0 %
EPEC EAE GENE STL QL NAA+NON-PROBE: NEGATIVE
ERYTHROCYTE [DISTWIDTH] IN BLOOD BY AUTOMATED COUNT: 13.5 % (ref 10–15)
ETEC LTA+ST1A+ST1B TOX ST NAA+NON-PROBE: NEGATIVE
G LAMBLIA DNA STL QL NAA+NON-PROBE: NEGATIVE
GLUCOSE SERPL-MCNC: 114 MG/DL (ref 70–99)
GLUCOSE SERPL-MCNC: 180 MG/DL (ref 70–99)
GLUCOSE UR STRIP-MCNC: NEGATIVE MG/DL
HCG INTACT+B SERPL-ACNC: <1 MIU/ML
HCO3 BLDV-SCNC: 21 MMOL/L (ref 21–28)
HCO3 BLDV-SCNC: 22 MMOL/L (ref 21–28)
HCO3 SERPL-SCNC: 17 MMOL/L (ref 22–29)
HCO3 SERPL-SCNC: 17 MMOL/L (ref 22–29)
HCT VFR BLD AUTO: 58.8 % (ref 35–47)
HCT VFR BLD CALC: 62 % (ref 35–47)
HGB BLD-MCNC: 19.5 G/DL (ref 11.7–15.7)
HGB BLD-MCNC: 21.1 G/DL (ref 11.7–15.7)
HGB UR QL STRIP: ABNORMAL
HYALINE CASTS: 53 /LPF
IMM GRANULOCYTES # BLD: 0.1 10E3/UL
IMM GRANULOCYTES NFR BLD: 1 %
INTERPRETATION ECG - MUSE: NORMAL
KETONES UR STRIP-MCNC: ABNORMAL MG/DL
LACTATE BLD-SCNC: 2.2 MMOL/L (ref 0.7–2)
LACTATE SERPL-SCNC: 1.8 MMOL/L (ref 0.7–2)
LEUKOCYTE ESTERASE UR QL STRIP: NEGATIVE
LIPASE SERPL-CCNC: 9 U/L (ref 13–60)
LYMPHOCYTES # BLD AUTO: 3.6 10E3/UL (ref 0.8–5.3)
LYMPHOCYTES NFR BLD AUTO: 39 %
MAGNESIUM SERPL-MCNC: 2.1 MG/DL (ref 1.7–2.3)
MCH RBC QN AUTO: 29.1 PG (ref 26.5–33)
MCHC RBC AUTO-ENTMCNC: 33.2 G/DL (ref 31.5–36.5)
MCV RBC AUTO: 88 FL (ref 78–100)
MONOCYTES # BLD AUTO: 0.5 10E3/UL (ref 0–1.3)
MONOCYTES NFR BLD AUTO: 5 %
MUCOUS THREADS #/AREA URNS LPF: PRESENT /LPF
NEUTROPHILS # BLD AUTO: 5 10E3/UL (ref 1.6–8.3)
NEUTROPHILS NFR BLD AUTO: 54 %
NITRATE UR QL: NEGATIVE
NOROVIRUS GI+II RNA STL QL NAA+NON-PROBE: NEGATIVE
NRBC # BLD AUTO: 0 10E3/UL
NRBC BLD AUTO-RTO: 0 /100
OSMOLALITY UR: 460 MMOL/KG (ref 100–1200)
P AXIS - MUSE: 50 DEGREES
P SHIGELLOIDES DNA STL QL NAA+NON-PROBE: NEGATIVE
PCO2 BLDV: 42 MM HG (ref 40–50)
PCO2 BLDV: 44 MM HG (ref 40–50)
PH BLDV: 7.28 [PH] (ref 7.32–7.43)
PH BLDV: 7.32 [PH] (ref 7.32–7.43)
PH UR STRIP: 5.5 [PH] (ref 5–7)
PLATELET # BLD AUTO: 402 10E3/UL (ref 150–450)
PO2 BLDV: 25 MM HG (ref 25–47)
PO2 BLDV: 28 MM HG (ref 25–47)
POTASSIUM BLD-SCNC: 3.8 MMOL/L (ref 3.4–5.3)
POTASSIUM SERPL-SCNC: 3.8 MMOL/L (ref 3.4–5.3)
POTASSIUM SERPL-SCNC: 4 MMOL/L (ref 3.4–5.3)
PR INTERVAL - MUSE: 136 MS
PROT SERPL-MCNC: 9.1 G/DL (ref 6.4–8.3)
QRS DURATION - MUSE: 80 MS
QT - MUSE: 356 MS
QTC - MUSE: 492 MS
R AXIS - MUSE: 69 DEGREES
RBC # BLD AUTO: 6.71 10E6/UL (ref 3.8–5.2)
RBC URINE: 4 /HPF
RVA RNA STL QL NAA+NON-PROBE: NEGATIVE
SALMONELLA SP RPOD STL QL NAA+PROBE: NEGATIVE
SAO2 % BLDV: 39 % (ref 70–75)
SAO2 % BLDV: 44 % (ref 70–75)
SAPO I+II+IV+V RNA STL QL NAA+NON-PROBE: NEGATIVE
SHIGELLA SP+EIEC IPAH ST NAA+NON-PROBE: NEGATIVE
SODIUM BLD-SCNC: 134 MMOL/L (ref 135–145)
SODIUM SERPL-SCNC: 132 MMOL/L (ref 135–145)
SODIUM SERPL-SCNC: 136 MMOL/L (ref 135–145)
SODIUM UR-SCNC: 29 MMOL/L
SP GR UR STRIP: 1.02 (ref 1–1.03)
SPECIMEN EXP DATE BLD: NORMAL
SQUAMOUS EPITHELIAL: 2 /HPF
SYSTOLIC BLOOD PRESSURE - MUSE: NORMAL MMHG
T AXIS - MUSE: 21 DEGREES
UROBILINOGEN UR STRIP-MCNC: 2 MG/DL
V CHOLERAE DNA SPEC QL NAA+PROBE: NEGATIVE
VENTRICULAR RATE- MUSE: 115 BPM
VIBRIO DNA SPEC NAA+PROBE: NEGATIVE
WBC # BLD AUTO: 9.2 10E3/UL (ref 4–11)
WBC URINE: 2 /HPF
Y ENTEROCOL DNA STL QL NAA+PROBE: NEGATIVE

## 2025-05-30 PROCEDURE — 82310 ASSAY OF CALCIUM: CPT | Performed by: STUDENT IN AN ORGANIZED HEALTH CARE EDUCATION/TRAINING PROGRAM

## 2025-05-30 PROCEDURE — 82803 BLOOD GASES ANY COMBINATION: CPT

## 2025-05-30 PROCEDURE — 83935 ASSAY OF URINE OSMOLALITY: CPT | Performed by: NURSE PRACTITIONER

## 2025-05-30 PROCEDURE — 93005 ELECTROCARDIOGRAM TRACING: CPT | Mod: 76

## 2025-05-30 PROCEDURE — 83605 ASSAY OF LACTIC ACID: CPT | Performed by: NURSE PRACTITIONER

## 2025-05-30 PROCEDURE — 36415 COLL VENOUS BLD VENIPUNCTURE: CPT | Performed by: NURSE PRACTITIONER

## 2025-05-30 PROCEDURE — 96365 THER/PROPH/DIAG IV INF INIT: CPT

## 2025-05-30 PROCEDURE — 86900 BLOOD TYPING SEROLOGIC ABO: CPT | Performed by: STUDENT IN AN ORGANIZED HEALTH CARE EDUCATION/TRAINING PROGRAM

## 2025-05-30 PROCEDURE — 99207 PR NO BILLABLE SERVICE THIS VISIT: CPT | Performed by: STUDENT IN AN ORGANIZED HEALTH CARE EDUCATION/TRAINING PROGRAM

## 2025-05-30 PROCEDURE — 84295 ASSAY OF SERUM SODIUM: CPT

## 2025-05-30 PROCEDURE — 258N000003 HC RX IP 258 OP 636: Performed by: NURSE PRACTITIONER

## 2025-05-30 PROCEDURE — 84702 CHORIONIC GONADOTROPIN TEST: CPT | Performed by: STUDENT IN AN ORGANIZED HEALTH CARE EDUCATION/TRAINING PROGRAM

## 2025-05-30 PROCEDURE — 86850 RBC ANTIBODY SCREEN: CPT | Performed by: STUDENT IN AN ORGANIZED HEALTH CARE EDUCATION/TRAINING PROGRAM

## 2025-05-30 PROCEDURE — 36415 COLL VENOUS BLD VENIPUNCTURE: CPT | Performed by: STUDENT IN AN ORGANIZED HEALTH CARE EDUCATION/TRAINING PROGRAM

## 2025-05-30 PROCEDURE — 120N000001 HC R&B MED SURG/OB

## 2025-05-30 PROCEDURE — 83735 ASSAY OF MAGNESIUM: CPT | Performed by: STUDENT IN AN ORGANIZED HEALTH CARE EDUCATION/TRAINING PROGRAM

## 2025-05-30 PROCEDURE — 99285 EMERGENCY DEPT VISIT HI MDM: CPT | Mod: 25

## 2025-05-30 PROCEDURE — 87493 C DIFF AMPLIFIED PROBE: CPT | Performed by: STUDENT IN AN ORGANIZED HEALTH CARE EDUCATION/TRAINING PROGRAM

## 2025-05-30 PROCEDURE — 82310 ASSAY OF CALCIUM: CPT | Performed by: NURSE PRACTITIONER

## 2025-05-30 PROCEDURE — 250N000011 HC RX IP 250 OP 636: Performed by: STUDENT IN AN ORGANIZED HEALTH CARE EDUCATION/TRAINING PROGRAM

## 2025-05-30 PROCEDURE — 83690 ASSAY OF LIPASE: CPT | Performed by: STUDENT IN AN ORGANIZED HEALTH CARE EDUCATION/TRAINING PROGRAM

## 2025-05-30 PROCEDURE — 87040 BLOOD CULTURE FOR BACTERIA: CPT | Performed by: STUDENT IN AN ORGANIZED HEALTH CARE EDUCATION/TRAINING PROGRAM

## 2025-05-30 PROCEDURE — 82570 ASSAY OF URINE CREATININE: CPT | Performed by: NURSE PRACTITIONER

## 2025-05-30 PROCEDURE — 85025 COMPLETE CBC W/AUTO DIFF WBC: CPT | Performed by: STUDENT IN AN ORGANIZED HEALTH CARE EDUCATION/TRAINING PROGRAM

## 2025-05-30 PROCEDURE — 96366 THER/PROPH/DIAG IV INF ADDON: CPT

## 2025-05-30 PROCEDURE — 93005 ELECTROCARDIOGRAM TRACING: CPT

## 2025-05-30 PROCEDURE — 81003 URINALYSIS AUTO W/O SCOPE: CPT | Performed by: STUDENT IN AN ORGANIZED HEALTH CARE EDUCATION/TRAINING PROGRAM

## 2025-05-30 PROCEDURE — 87507 IADNA-DNA/RNA PROBE TQ 12-25: CPT | Performed by: STUDENT IN AN ORGANIZED HEALTH CARE EDUCATION/TRAINING PROGRAM

## 2025-05-30 PROCEDURE — 74176 CT ABD & PELVIS W/O CONTRAST: CPT

## 2025-05-30 PROCEDURE — 99223 1ST HOSP IP/OBS HIGH 75: CPT | Performed by: NURSE PRACTITIONER

## 2025-05-30 PROCEDURE — 258N000003 HC RX IP 258 OP 636: Performed by: STUDENT IN AN ORGANIZED HEALTH CARE EDUCATION/TRAINING PROGRAM

## 2025-05-30 PROCEDURE — 82374 ASSAY BLOOD CARBON DIOXIDE: CPT | Performed by: NURSE PRACTITIONER

## 2025-05-30 PROCEDURE — 82247 BILIRUBIN TOTAL: CPT | Performed by: STUDENT IN AN ORGANIZED HEALTH CARE EDUCATION/TRAINING PROGRAM

## 2025-05-30 PROCEDURE — 84300 ASSAY OF URINE SODIUM: CPT | Performed by: NURSE PRACTITIONER

## 2025-05-30 RX ORDER — TRAZODONE HYDROCHLORIDE 100 MG/1
100 TABLET ORAL AT BEDTIME
Status: DISCONTINUED | OUTPATIENT
Start: 2025-05-30 | End: 2025-06-01 | Stop reason: HOSPADM

## 2025-05-30 RX ORDER — CALCIUM CARBONATE 500 MG/1
1000 TABLET, CHEWABLE ORAL 4 TIMES DAILY PRN
Status: DISCONTINUED | OUTPATIENT
Start: 2025-05-30 | End: 2025-06-01 | Stop reason: HOSPADM

## 2025-05-30 RX ORDER — ONDANSETRON 2 MG/ML
4 INJECTION INTRAMUSCULAR; INTRAVENOUS ONCE
Status: DISCONTINUED | OUTPATIENT
Start: 2025-05-30 | End: 2025-05-30

## 2025-05-30 RX ORDER — DEXTROAMPHETAMINE SACCHARATE, AMPHETAMINE ASPARTATE MONOHYDRATE, DEXTROAMPHETAMINE SULFATE AND AMPHETAMINE SULFATE 7.5; 7.5; 7.5; 7.5 MG/1; MG/1; MG/1; MG/1
30 CAPSULE, EXTENDED RELEASE ORAL DAILY
COMMUNITY

## 2025-05-30 RX ORDER — AMOXICILLIN 250 MG
1 CAPSULE ORAL 2 TIMES DAILY PRN
Status: DISCONTINUED | OUTPATIENT
Start: 2025-05-30 | End: 2025-06-01 | Stop reason: HOSPADM

## 2025-05-30 RX ORDER — MAGNESIUM SULFATE HEPTAHYDRATE 40 MG/ML
2 INJECTION, SOLUTION INTRAVENOUS ONCE
Status: COMPLETED | OUTPATIENT
Start: 2025-05-30 | End: 2025-05-30

## 2025-05-30 RX ORDER — SODIUM CHLORIDE 9 MG/ML
INJECTION, SOLUTION INTRAVENOUS ONCE
Status: COMPLETED | OUTPATIENT
Start: 2025-05-30 | End: 2025-05-30

## 2025-05-30 RX ORDER — ACETAMINOPHEN 325 MG/1
650 TABLET ORAL EVERY 4 HOURS PRN
Status: DISCONTINUED | OUTPATIENT
Start: 2025-05-30 | End: 2025-06-01 | Stop reason: HOSPADM

## 2025-05-30 RX ORDER — SODIUM CHLORIDE, SODIUM LACTATE, POTASSIUM CHLORIDE, CALCIUM CHLORIDE 600; 310; 30; 20 MG/100ML; MG/100ML; MG/100ML; MG/100ML
INJECTION, SOLUTION INTRAVENOUS CONTINUOUS
Status: DISCONTINUED | OUTPATIENT
Start: 2025-05-30 | End: 2025-05-31

## 2025-05-30 RX ORDER — TIRZEPATIDE 10 MG/.5ML
10 INJECTION, SOLUTION SUBCUTANEOUS
COMMUNITY

## 2025-05-30 RX ORDER — HYDROXYZINE HYDROCHLORIDE 25 MG/1
25 TABLET, FILM COATED ORAL AT BEDTIME
COMMUNITY

## 2025-05-30 RX ORDER — HYDROXYZINE HYDROCHLORIDE 25 MG/1
25 TABLET, FILM COATED ORAL AT BEDTIME
Status: DISCONTINUED | OUTPATIENT
Start: 2025-05-30 | End: 2025-06-01 | Stop reason: HOSPADM

## 2025-05-30 RX ORDER — BUPROPION HYDROCHLORIDE 150 MG/1
150 TABLET ORAL EVERY MORNING
Status: DISCONTINUED | OUTPATIENT
Start: 2025-05-31 | End: 2025-06-01 | Stop reason: HOSPADM

## 2025-05-30 RX ORDER — BUPROPION HYDROCHLORIDE 150 MG/1
150 TABLET ORAL EVERY MORNING
COMMUNITY

## 2025-05-30 RX ORDER — PROCHLORPERAZINE MALEATE 10 MG
10 TABLET ORAL EVERY 6 HOURS PRN
Status: DISCONTINUED | OUTPATIENT
Start: 2025-05-30 | End: 2025-06-01 | Stop reason: HOSPADM

## 2025-05-30 RX ORDER — AMOXICILLIN 250 MG
2 CAPSULE ORAL 2 TIMES DAILY PRN
Status: DISCONTINUED | OUTPATIENT
Start: 2025-05-30 | End: 2025-06-01 | Stop reason: HOSPADM

## 2025-05-30 RX ORDER — LOPERAMIDE HYDROCHLORIDE 2 MG/1
2 CAPSULE ORAL 4 TIMES DAILY PRN
Status: DISCONTINUED | OUTPATIENT
Start: 2025-05-30 | End: 2025-06-01 | Stop reason: HOSPADM

## 2025-05-30 RX ORDER — ACETAMINOPHEN 650 MG/1
650 SUPPOSITORY RECTAL EVERY 4 HOURS PRN
Status: DISCONTINUED | OUTPATIENT
Start: 2025-05-30 | End: 2025-06-01 | Stop reason: HOSPADM

## 2025-05-30 RX ORDER — ESCITALOPRAM OXALATE 20 MG/1
20 TABLET ORAL DAILY
Status: DISCONTINUED | OUTPATIENT
Start: 2025-05-31 | End: 2025-06-01 | Stop reason: HOSPADM

## 2025-05-30 RX ORDER — ATORVASTATIN CALCIUM 20 MG/1
20 TABLET, FILM COATED ORAL DAILY
COMMUNITY

## 2025-05-30 RX ADMIN — SODIUM CHLORIDE: 0.9 INJECTION, SOLUTION INTRAVENOUS at 20:19

## 2025-05-30 RX ADMIN — SODIUM CHLORIDE 2000 ML: 0.9 INJECTION, SOLUTION INTRAVENOUS at 16:05

## 2025-05-30 RX ADMIN — MAGNESIUM SULFATE HEPTAHYDRATE 2 G: 40 INJECTION, SOLUTION INTRAVENOUS at 16:05

## 2025-05-30 RX ADMIN — SODIUM CHLORIDE, SODIUM LACTATE, POTASSIUM CHLORIDE, AND CALCIUM CHLORIDE: .6; .31; .03; .02 INJECTION, SOLUTION INTRAVENOUS at 21:19

## 2025-05-30 ASSESSMENT — ACTIVITIES OF DAILY LIVING (ADL)
ADLS_ACUITY_SCORE: 45
ADLS_ACUITY_SCORE: 57
ADLS_ACUITY_SCORE: 41
ADLS_ACUITY_SCORE: 45
ADLS_ACUITY_SCORE: 45

## 2025-05-30 ASSESSMENT — COLUMBIA-SUICIDE SEVERITY RATING SCALE - C-SSRS
2. HAVE YOU ACTUALLY HAD ANY THOUGHTS OF KILLING YOURSELF IN THE PAST MONTH?: NO
1. IN THE PAST MONTH, HAVE YOU WISHED YOU WERE DEAD OR WISHED YOU COULD GO TO SLEEP AND NOT WAKE UP?: NO
6. HAVE YOU EVER DONE ANYTHING, STARTED TO DO ANYTHING, OR PREPARED TO DO ANYTHING TO END YOUR LIFE?: NO

## 2025-05-30 NOTE — ED TRIAGE NOTES
Pt arrives with mother with c/o constant diarrhea and lethargy x 4 days. Pt's mother reports pt was taken off her GLP-1 for 1 month while awaiting insurance PA and restarted 5/19. Pt's mother reports pt was restarted back on her previous dose. Diarrhea has been watery. Not able to take any liquids. Unable to read BP or temp in triage room. New onset cramps in bilateral calves this morning. Bloating in stomach.

## 2025-05-30 NOTE — PHARMACY-ADMISSION MEDICATION HISTORY
Pharmacist Admission Medication History    Admission medication history is complete. The information provided in this note is only as accurate as the sources available at the time of the update.    Information Source(s): Patient via in-person    Pertinent Information: Please see other ED notes regarding Zepbound.    Changes made to PTA medication list:  Added: None  Deleted: Introvale, Fish Oil, Simvastatin, Vitamin D, Topiramate  Changed: Trazodone -> directions, Hydroxyzine -> directions, Bupropion -> dose, Lexapro -> dose    Allergies reviewed with patient and updates made in EHR: yes    Medication History Completed By: Michael Pathak [facility] 5/30/2025 6:09 PM    PTA Med List   Medication Sig Last Dose/Taking    amphetamine-dextroamphetamine (ADDERALL XR) 30 MG 24 hr capsule Take 30 mg by mouth daily. Past Week    atorvastatin (LIPITOR) 20 MG tablet Take 20 mg by mouth daily. Past Week    buPROPion (WELLBUTRIN XL) 150 MG 24 hr tablet Take 150 mg by mouth every morning. Past Week    Escitalopram Oxalate (LEXAPRO PO) Take 20 mg by mouth daily. Past Week    hydrOXYzine HCl (ATARAX) 25 MG tablet Take 25 mg by mouth at bedtime. Taking    NAPROXEN PO Take 1 tablet by mouth 2 times daily as needed. Taking As Needed    TRAZODONE HCL PO Take 100 mg by mouth at bedtime. Taking

## 2025-05-30 NOTE — H&P
"[facility]  History and Physical - Hospitalist Service       Date of Admission:  5/30/2025  PRIMARY CARE PROVIDER:    Elsy Zhu    Assessment & Plan   Jennifer Okeefe is a 29 year old female with a past medical history significant for ADHD, dyslipidemia, depression, obesity who presents to the ED for evaluation of near syncope, nausea, vomiting, diarrhea.     Patient states she has been on Zepbound since January 2025, started at 2.5 mg and slowly titrated up to 10 mg.  In mid April 2025, the patient had to take a 1 month break due to a prior authorization being collected from her insurance.  Once the PA was obtained, she was restarted at her previous dose of 10 mg.  Shortly after administration of the 10 mg, the patient began to have nausea, which progressed into vomiting and watery diarrhea.  She was unable to tolerate any fluids, became oliguric on 5/29, and near syncopal bite 5/30 presented to the emergency department    Acute Renal Failure   Anion gap metabolic acidosis  Suspect prerenal in the setting of profound hypovolemia due to insensible loss given watery diarrhea.  Patient states her last time urinating was 5/29, \" just a small amount\"; and none since.  She was straight cathed on admission, UA shows trace ketones, proteinuria, urobilinogen 2; nitrite negative, small amount of blood, WBC 2, mucus and hyaline cast present.  *ED workup: Creatinine 3.96, GFR 15, potassium 4.0, magnesium 2.1, serum bicarb 17.  Initial VBG with mild acidosis with pH 7.28, pCO2 44, bicarb 21; labs repeated approximately 4 hours later showing improvement, creatinine 2.67, GFR 24, metabolic acidosis present with AG 17, serum bicarb 17.  *CT abdomen pelvis showing 1 mm stone in the right mid kidney, no ureteral stones, no hydronephrosis; abdomen otherwise unremarkable.  *ED course: Given 2 L 0.9% IV fluid, 2 g mag IV x 1;   - IV fluids LR @100 ml/hr.    - Avoid nephrotoxic agents as able.    - " Add on urine creatinine, urine sodium, urine osmole, serum osmol  - BMP ordered 05/31/25     Hypochloremic hyponatremia, resolved  Lactic acidosis, 2.2 > 1.8  Likely hypovolemic in nature, resolved and repeat labs after 2 L IVF demonstrating resolution of hyponatremia with repeat sodium 136.  *Blood cultures collected in the emergency department, low suspicion of infectious etiology.  No antibiotics initiated given lack of source and other etiology present.    Prolonged QT  ECG on admission shows sinus tach without any acute ST/T wave changes consistent with acute ischemia, QTc 492.  - Repeat ECG order for 5/30 AM to reassess QTc  - Avoid QTc prolonging meds in the meantime (PTA trazodone, Zofran, etc)    HLP  - Hold PTA statin.      Obesity   *There is no height or weight on file to calculate BMI.    Increase in all-cause morbidity and mortality.   - Follow up with PCP regarding ongoing management.    - Hold Zepbound    Major depressive disorder with anxiety  - Resumed on PTA Lexapro and Wellbutrin to avoid withdrawal symptoms (recheck QTc in a.m.)  - Hold trazodone      Clinically Significant Risk Factors Present on Admission         # Hyponatremia: Lowest Na = 132 mmol/L in last 2 days, will monitor as appropriate  # Hypochloremia: Lowest Cl = 95 mmol/L in last 2 days, will monitor as appropriate     # Anion Gap Metabolic Acidosis: Highest Anion Gap = 20 mmol/L in last 2 days, will monitor and treat as appropriate                                Diet:  Clears, advance diet as tolerated  DVT Prophylaxis: Pneumatic Compression Devices  Purdy Catheter: Not present  Lines: None     Cardiac Monitoring: None  Code Status:  Full Code          Disposition Plan      Expected Discharge Date: 06/01/2025           Entered: LISA Jacobson CNP 05/30/2025, 6:14 PM       Medically Ready for Discharge: Anticipated in 2-4 Days      The patient's care was discussed with the Attending Physician, Dr. Marti, Bedside Nurse,  Patient, and Patient's Family.    LISA Jacobson [facility]  Securely message with the [employer] Web Console (learn more here)  Text page via [facility] Paging/Directory      ______________________________________________________________________    Chief Complaint   Diarrhea, nausea/vomiting     History is obtained from the patient and EMR.      History of Present Illness   Jennifer Okeefe is a 29 year old female with a past medical history significant for ADHD, dyslipidemia, depression, obesity who presents to the ED for evaluation of near syncope, nausea, vomiting, diarrhea.    Patient states she has been on Zepbound since January 2025, started at 2.5 mg and slowly titrated up to 10 mg.  In mid April 2025, the patient had to take a 1 month break due to a prior authorization being collected from her insurance.  Once the PA was obtained, she was restarted at her previous dose of 10 mg.  Shortly after administration of the 10 mg, the patient began to have nausea, which progressed into vomiting and watery diarrhea.  She was unable to tolerate any fluids, became oliguric on 5/29, and near syncopal bite 5/30 presented to the emergency department    While in triage, the patient was near syncopal with nursing staff, found to be profoundly hypotensive and only able to get a BP while in the Trendelenburg position.  She was hypothermic at 96.1 degrees, tachycardic in the 120s.  Initial workup in the emergency department shows acute renal failure with creatinine 3.96, GFR 15, serum bicarb 17, sodium 132, anion gap 20.  Lactic acid 2.2, delta pending.  Lipase 9.  Initial blood gases show VBG with pH 7.28, pCO2 44, bicarb 21.  CBC with hemoglobin 19.5, hematocrit 58.8, likely hemoconcentrated.  No leukocytosis with WBC 9.2.  Blood cultures were collected in the emergency department, however thought to be low suspicion of infection.  Enteric panel pending.  A CT of the abdomen pelvis collected Is  grossly unremarkable with a 1 mm stone in the right mid kidney, no other stones, no ureteral calculi or hydronephrosis present.    I evaluated the patient the emergency department with her mother at the bedside.  She appears extremely fatigued, is conversant, but withdrawn.  She denies any ongoing dizziness/lightheadedness, feels significantly improved from where she was at when she arrived.  Still has not made any urine since 2 L of IV fluid.  We discussed her lab results with her mother present, plan of care.    Past Medical History    I have reviewed this patient's medical history and updated it with pertinent information if needed.   Past Medical History:   Diagnosis Date    ADHD (attention deficit hyperactivity disorder)     Depressive disorder     Uncomplicated asthma        Prior to Admission Medications   Prior to Admission Medications   Prescriptions Last Dose Informant Patient Reported? Taking?   Escitalopram Oxalate (LEXAPRO PO) Past Week  Yes Yes   Sig: Take 20 mg by mouth daily.   NAPROXEN PO   Yes Yes   Sig: Take 1 tablet by mouth 2 times daily as needed.   TRAZODONE HCL PO   Yes Yes   Sig: Take 100 mg by mouth at bedtime.   amphetamine-dextroamphetamine (ADDERALL XR) 30 MG 24 hr capsule Past Week  Yes Yes   Sig: Take 30 mg by mouth daily.   atorvastatin (LIPITOR) 20 MG tablet Past Week  Yes Yes   Sig: Take 20 mg by mouth daily.   buPROPion (WELLBUTRIN XL) 150 MG 24 hr tablet Past Week  Yes Yes   Sig: Take 150 mg by mouth every morning.   hydrOXYzine HCl (ATARAX) 25 MG tablet   Yes Yes   Sig: Take 25 mg by mouth at bedtime.   order for DME   Yes No   Sig: Kneehab unit for left knee; non-surgical; PT alone not helping with quadriceps atrophy and inhibition      Facility-Administered Medications: None     Allergies   No Known Allergies    Physical Exam   Vital Signs: Temp: (!) 96.7  F (35.9  C) Temp src: Temporal BP: 116/84 Pulse: 105   Resp: 21 SpO2: 99 % O2 Device: None (Room air)    Weight: 0 lbs 0  oz    Physical Exam  Vitals and nursing note reviewed.   Constitutional:       General: She is not in acute distress.     Appearance: She is ill-appearing. She is not toxic-appearing or diaphoretic.   HENT:      Mouth/Throat:      Mouth: Mucous membranes are dry.   Cardiovascular:      Rate and Rhythm: Regular rhythm. Tachycardia present.      Heart sounds: No murmur heard.  Pulmonary:      Effort: Pulmonary effort is normal.      Breath sounds: Normal breath sounds and air entry.   Abdominal:      General: Abdomen is flat.      Palpations: Abdomen is soft.      Tenderness: There is no abdominal tenderness.   Musculoskeletal:      Right lower leg: No edema.      Left lower leg: No edema.   Skin:     Coloration: Skin is sallow.   Neurological:      Mental Status: She is alert and oriented to person, place, and time.      GCS: GCS eye subscore is 4. GCS verbal subscore is 5. GCS motor subscore is 6.   Psychiatric:         Mood and Affect: Mood normal.         Behavior: Behavior normal. Behavior is cooperative.         Medical Decision Making       79 MINUTES SPENT BY ME on the date of service doing chart review, history, exam, documentation & further activities per the note.         Data   Data reviewed today: I reviewed all medications, new labs and imaging results over the last 24 hours. I personally reviewed the abdominal CT image(s) showing 1 mm right mid renal stone, no hydronephrosis.      I have personally reviewed the following data over the past 24 hrs:    9.2  \   19.5 (H)   / 402     136 102 13.3 /  114 (H)   3.8 17 (L) 2.67 (H) \     ALT: 18 AST: 12 AP: 261 (H) TBILI: 0.7   ALB: 4.3 TOT PROTEIN: 9.1 (H) LIPASE: 9 (L)     Procal: N/A CRP: N/A Lactic Acid: 1.8         Imaging results reviewed over the past 24 hrs:   Recent Results (from the past 24 hours)   Abd/pelvis CT no contrast - Stone Protocol    Narrative    EXAM: CT ABDOMEN PELVIS W/O CONTRAST  LOCATION: [facility]  DATE:  5/30/2025    INDICATION: new renal failure, nausea, vomiting, diarrhea  COMPARISON: None.  TECHNIQUE: CT scan of the abdomen and pelvis was performed without IV contrast. Multiplanar reformats were obtained. Dose reduction techniques were used.  CONTRAST: None.    FINDINGS:   LOWER CHEST: Normal.    HEPATOBILIARY: Normal.    PANCREAS: Normal.    SPLEEN: Normal.    ADRENAL GLANDS: Normal.    KIDNEYS/BLADDER: There is a 1 mm stone in the mid right kidney. No other stones. No ureteral calculi. No hydronephrosis.    BOWEL: Normal.    LYMPH NODES: Normal.    VASCULATURE: Normal.    PELVIC ORGANS: Intrauterine device.    MUSCULOSKELETAL: Normal.      Impression    IMPRESSION:   1.  There is a 1 mm stone in the mid right kidney. No ureteral stones. No hydronephrosis. Abdomen otherwise negative.

## 2025-05-30 NOTE — ED PROVIDER NOTES
Emergency Department Note      History of Present Illness     Chief Complaint   Diarrhea      HPI   Jennifer Okeefe is a 29 year old female with a history of hyperlipidemia, gastroesophageal reflux disease here for evaluation of diarrhea. Patient arrived with mother who states that the patient has been experiencing constant diarrhea and has been lethargic for the past 4 days. The patient's mother reports that the patient was taken off her GLP-1 for a month while awaiting insurance PA and restarted 11 days ago on her previous dosage. The patient's mother says that her diarrhea has been watery and colored, but has recently become clear. Her mother notes that she has not been able to take any liquids. The patient's mother reports that she started having bilateral cramps in her calves this morning and a bloated stomach. Her mother notes that she took 10mg of Zepbound. Patient reports taking Zofran for nausea which has provided some relief. No vomiting. No chance of pregnancy. She denies being sexually active.     Independent Historian   Mother as detailed above.    Review of External Notes   None    Past Medical History     Medical History and Problem List   IUD insertion  PCOS  Binge-eating disorder  AUB  Gastroesophageal reflux disease  Chronic rhinitis  Speech disorder  Chronic folliculitis  Generalized social phobia  Hyperlipidemia  Hyperalphalipoproteinemia  Myopia  Polycystic ovaries  Mild intermittent asthma  Agoraphobia with panic attacks  Morbid obesity   ADHD   Depression  Anxiety  Menorrhagia  Chondromalacia of left knee    Medications   Lexapro  Adderall XR  Desyrel  Lipitor  Wellbutrin XL  Atarax  Mirena  Zepbound  Zofran    Physical Exam     Patient Vitals for the past 24 hrs:   BP Temp Temp src Pulse Resp SpO2   05/30/25 2014 119/81 -- -- 116 11 97 %   05/30/25 1959 103/69 -- -- 113 10 99 %   05/30/25 1949 -- -- -- -- 17 --   05/30/25 1944 100/80 -- -- 115 -- 97 %   05/30/25 1929 100/64 -- -- 111 22 96  %   05/30/25 1914 113/68 -- -- 113 22 100 %   05/30/25 1858 106/76 -- -- 113 -- 97 %   05/30/25 1846 (!) 95/30 -- -- 115 16 96 %   05/30/25 1831 97/64 -- -- 110 -- 97 %   05/30/25 1821 -- 97.7  F (36.5  C) Oral -- -- --   05/30/25 1815 121/83 -- -- 111 17 96 %   05/30/25 1745 116/84 -- -- 105 -- 99 %   05/30/25 1730 116/80 -- -- 109 21 97 %   05/30/25 1729 -- (!) 96.7  F (35.9  C) Temporal -- -- --   05/30/25 1642 -- (!) 96.1  F (35.6  C) Temporal -- -- --   05/30/25 1620 100/65 -- -- 107 12 94 %   05/30/25 1610 (!) 119/91 -- -- 115 14 98 %   05/30/25 1602 89/63 -- -- -- -- --   05/30/25 1545 -- -- -- (!) 129 22 97 %     Physical Exam  General: Ill-appearing woman, clammy, answering questions  HEENT: Atraumatic   EOM normal   External ears normal   Trachea midline  Neck: Supple, normal ROM  CV: Tachycardic, regular rhythm   No lower extremity edema  Thready radial and DP pulses, present but weak femoral pulses  PULM: Breath sounds normal bilaterally  No wheezes or rales  ABD: Soft, diffuse lower abdominal tenderness, non-distended  Normal bowel sounds   No rebound or guarding   MSK: No gross deformities  NEURO: Alert, no focal deficits  Skin: Wet and clammy    Diagnostics     Lab Results   Labs Ordered and Resulted from Time of ED Arrival to Time of ED Departure   COMPREHENSIVE METABOLIC PANEL - Abnormal       Result Value    Sodium 132 (*)     Potassium 4.0      Carbon Dioxide (CO2) 17 (*)     Anion Gap 20 (*)     Urea Nitrogen 14.7      Creatinine 3.96 (*)     GFR Estimate 15 (*)     Calcium 10.0      Chloride 95 (*)     Glucose 180 (*)     Alkaline Phosphatase 261 (*)     AST 12      ALT 18      Protein Total 9.1 (*)     Albumin 4.3      Bilirubin Total 0.7     CBC WITH PLATELETS AND DIFFERENTIAL - Abnormal    WBC Count 9.2      RBC Count 6.71 (*)     Hemoglobin 19.5 (*)     Hematocrit 58.8 (*)     MCV 88      MCH 29.1      MCHC 33.2      RDW 13.5      Platelet Count 402      % Neutrophils 54      % Lymphocytes  39      % Monocytes 5      % Eosinophils 0      % Basophils 1      % Immature Granulocytes 1      NRBCs per 100 WBC 0      Absolute Neutrophils 5.0      Absolute Lymphocytes 3.6      Absolute Monocytes 0.5      Absolute Eosinophils 0.0      Absolute Basophils 0.1      Absolute Immature Granulocytes 0.1      Absolute NRBCs 0.0     LIPASE - Abnormal    Lipase 9 (*)    ISTAT GASES ELECTROLYTES VENOUS POCT - Abnormal    CPB Applied No      Hematocrit POCT 62 (*)     Bicarbonate Venous POCT 22      Hemoglobin POCT 21.1 (*)     Potassium POCT 3.8      Sodium POCT 134 (*)     pCO2 Venous POCT 42      pH Venous POCT 7.32      pO2 Venous POCT 25      O2 Sat, Venous POCT 39 (*)     Base Excess/Deficit (+/-) POCT -5.0 (*)    ISTAT GASES LACTATE VENOUS POCT - Abnormal    Lactic Acid POCT 2.2 (*)     Bicarbonate Venous POCT 21      O2 Sat, Venous POCT 44 (*)     pCO2 Venous POCT 44      pH Venous POCT 7.28 (*)     pO2 Venous POCT 28      Base Excess/Deficit (+/-) POCT -6.0 (*)    ROUTINE UA WITH MICROSCOPIC REFLEX TO CULTURE - Abnormal    Color Urine Yellow      Appearance Urine Slightly Cloudy (*)     Glucose Urine Negative      Bilirubin Urine Negative      Ketones Urine Trace (*)     Specific Gravity Urine 1.025      Blood Urine Small (*)     pH Urine 5.5      Protein Albumin Urine 30 (*)     Urobilinogen Urine 2.0 (*)     Nitrite Urine Negative      Leukocyte Esterase Urine Negative      Mucus Urine Present (*)     RBC Urine 4 (*)     WBC Urine 2      Squamous Epithelials Urine 2 (*)     Hyaline Casts Urine 53 (*)    BASIC METABOLIC PANEL - Abnormal    Sodium 136      Potassium 3.8      Chloride 102      Carbon Dioxide (CO2) 17 (*)     Anion Gap 17 (*)     Urea Nitrogen 13.3      Creatinine 2.67 (*)     GFR Estimate 24 (*)     Calcium 8.9      Glucose 114 (*)    MAGNESIUM - Normal    Magnesium 2.1     HCG QUANTITATIVE PREGNANCY - Normal    hCG Quantitative <1     LACTIC ACID WHOLE BLOOD - Normal    Lactic Acid 1.8      ENTERIC BACTERIA AND VIRUS PANEL BY PCR - Normal    Campylobacter species Negative      Salmonella species Negative      Vibrio species Negative      Vibrio cholerae Negative      Yersinia enterocolitica Negative      Enteropathogenic E. coli (EPEC) Negative      Shiga-like toxin-producing E. coli (STEC) Negative      Shigella/Enteroinvasive E. coli (EIEC) Negative      Cryptosporidium species Negative      Giardia lamblia Negative      Norovirus Gl/Gll Negative      Rotavirus A Negative      Plesiomonas shigelloides Negative      Enteroaggregative E. coli (EAEC) Negative      Enterotoxigenic E. coli (ETEC) Negative      E. coli O157 NA      Cyclospora cayetanensis Negative      Entamoeba histolytica Negative      Adenovirus F40/41 Negative      Astrovirus Negative      Sapovirus Negative     C. DIFFICILE TOXIN B PCR WITH REFLEX TO C. DIFFICILE EIA - Normal    C Difficile Toxin B by PCR Negative     TYPE AND SCREEN, ADULT    ABO/RH(D) O POS      Antibody Screen Negative      SPECIMEN EXPIRATION DATE 6/2/2025 11:59:00 PM CDT     BLOOD CULTURE   BLOOD CULTURE   ABO/RH TYPE AND SCREEN       Imaging   Abd/pelvis CT no contrast - Stone Protocol   Final Result   IMPRESSION:    1.  There is a 1 mm stone in the mid right kidney. No ureteral stones. No hydronephrosis. Abdomen otherwise negative.             EKG   ECG results from 05/30/25   EKG 12 lead     Value    Systolic Blood Pressure     Diastolic Blood Pressure     Ventricular Rate 115    Atrial Rate 115    IL Interval 136    QRS Duration 80        QTc 492    P Axis 50    R AXIS 69    T Axis 21    Interpretation ECG      Sinus tachycardia  QTcB >= 480 msec  Abnormal ECG  When compared with ECG of 31-Aug-2013 19:02,  No significant change was found  Interpreted by me at 1647      Independent Interpretation   None    ED Course      Medications Administered   Medications   ondansetron (ZOFRAN) injection 4 mg (has no administration in time range)   magnesium  sulfate 2 g in 50 mL sterile water intermittent infusion (0 g Intravenous Stopped 5/30/25 1821)   sodium chloride 0.9% BOLUS 2,000 mL (0 mLs Intravenous Stopped 5/30/25 1821)   sodium chloride 0.9 % infusion ( Intravenous $New Bag 5/30/25 2019)       Procedures   Procedures     Discussion of Management   Admitting Hospitalist, Sonia Acosta    ED Course   ED Course as of 05/30/25 2049   Fri May 30, 2025   1553 I obtained history and examined the patient as noted above.    1649 I rechecked and updated the patient.    1801 I rechecked and updated the patient.    1821 Spoke with Sonia Acosta hospitalist accepting admission       Additional Documentation  None    Medical Decision Making / Diagnosis     CMS Diagnoses: Lactic acid elevated due to acute renal failure and dehydration. At this time there are no signs of sepsis or septic shock    MIPS   None      MDM   Jennifer Okeefe is a 29 year old female brought back urgently from triage due to unmeasurable blood pressures.  Thready radial pulses, near syncope, clammy appearance.  Transferred to stabilization bay and put in reverse Trendelenburg.  She was conscious and answering questions though appeared unwell.  Empirically given 2 g magnesium, ordered EKG, 2 L of IV fluids.  Suspect most likely profound dehydration from restarting GLP-1 at a high dose.  Labs show acute renal failure with creatinine almost 4.  Urine does not show infection.  Stool studies ordered and ultimately returned negative.  She does have a slight lactic acidosis.  Blood cultures are obtained but will hold on antibiotics due to no obvious signs of infection at this time.  Put on fluid rate.  Serum magnesium is surprisingly normal although I suspect hemoconcentration contributing especially because QTc is prolonged on EKG and all of her labs appear significantly hemoconcentrated.  Will keep on telemetry.  Strict ins and outs.  CT Noncon does not reveal obstructive cause of her acute renal failure.   Patient and family at bedside voiced need for admission and understand plan.  Blood pressure has recovered with fluids alone not requiring vasopressors.    Disposition   The patient was admitted to the hospital.     Diagnosis     ICD-10-CM    1. Acute renal failure, unspecified acute renal failure type  N17.9       2. Prolonged Q-T interval on ECG  R94.31       3. Nausea vomiting and diarrhea  R11.2     R19.7                Scribe Disclosure:  I, Ana Betancur, am serving as a scribe at 4:44 PM on 5/30/2025 to document services personally performed by Anna Devi DO based on my observations and the provider's statements to me.        Anna Devi DO  05/30/25 2049

## 2025-05-30 NOTE — ED NOTES
[facility]  ED Nurse Handoff Report    ED Chief complaint: Diarrhea      ED Diagnosis:   Final diagnoses:   Acute renal failure, unspecified acute renal failure type   Prolonged Q-T interval on ECG       Code Status: Full Code    Allergies: No Known Allergies    Patient Story: diarrhea  Focused Assessment:  Patient came to ED accompanied by mother for 5 days of potent diarrhea after resuming GLP1 after one month pause due to Insurance issues. Reports resuming at the rate of the highest dose she was on before holding for one month. Will be admitted for further treatment of dehydration and hypotension.     Treatments and/or interventions provided: see MAR  Patient's response to treatments and/or interventions: TBD    To be done/followed up on inpatient unit:  close monitoring    Does this patient have any cognitive concerns?: na    Activity level - Baseline/Home:  Independent  Activity Level - Current:   Stand with Assist    Patient's Preferred language: English   Needed?: No    Isolation: None  Infection: Not Applicable  Sepsis treatment initiated: No  Patient tested for COVID 19 prior to admission: NO  Bariatric?: No    Vital Signs:   Vitals:    05/30/25 1815 05/30/25 1821 05/30/25 1831 05/30/25 1846   BP: 121/83  97/64 (!) 95/30   Pulse: 111  110 115   Resp: 17   16   Temp:  97.7  F (36.5  C)     TempSrc:  Oral     SpO2: 96%  97% 96%       Cardiac Rhythm:     Was the PSS-3 completed:   Yes  What interventions are required if any?               Family Comments: mother at bedside  OBS brochure/video discussed/provided to patient/family: N/A              Name of person given brochure if not patient: na              Relationship to patient: na    For the majority of the shift this patient's behavior was Green.   Behavioral interventions performed were none.    ED NURSE PHONE NUMBER: *[phone]

## 2025-05-31 LAB
ANION GAP SERPL CALCULATED.3IONS-SCNC: 12 MMOL/L (ref 7–15)
ANION GAP SERPL CALCULATED.3IONS-SCNC: 14 MMOL/L (ref 7–15)
BUN SERPL-MCNC: 12.8 MG/DL (ref 6–20)
BUN SERPL-MCNC: 13 MG/DL (ref 6–20)
CALCIUM SERPL-MCNC: 8.7 MG/DL (ref 8.8–10.4)
CALCIUM SERPL-MCNC: 8.7 MG/DL (ref 8.8–10.4)
CHLORIDE SERPL-SCNC: 101 MMOL/L (ref 98–107)
CHLORIDE SERPL-SCNC: 101 MMOL/L (ref 98–107)
CREAT SERPL-MCNC: 1.65 MG/DL (ref 0.51–0.95)
CREAT SERPL-MCNC: 2 MG/DL (ref 0.51–0.95)
EGFRCR SERPLBLD CKD-EPI 2021: 34 ML/MIN/1.73M2
EGFRCR SERPLBLD CKD-EPI 2021: 43 ML/MIN/1.73M2
ERYTHROCYTE [DISTWIDTH] IN BLOOD BY AUTOMATED COUNT: 13.2 % (ref 10–15)
GLUCOSE SERPL-MCNC: 109 MG/DL (ref 70–99)
GLUCOSE SERPL-MCNC: 99 MG/DL (ref 70–99)
HCO3 SERPL-SCNC: 17 MMOL/L (ref 22–29)
HCO3 SERPL-SCNC: 19 MMOL/L (ref 22–29)
HCT VFR BLD AUTO: 47.4 % (ref 35–47)
HGB BLD-MCNC: 16.1 G/DL (ref 11.7–15.7)
MAGNESIUM SERPL-MCNC: 2.1 MG/DL (ref 1.7–2.3)
MCH RBC QN AUTO: 28.9 PG (ref 26.5–33)
MCHC RBC AUTO-ENTMCNC: 34 G/DL (ref 31.5–36.5)
MCV RBC AUTO: 85 FL (ref 78–100)
PHOSPHATE SERPL-MCNC: 3.8 MG/DL (ref 2.5–4.5)
PLATELET # BLD AUTO: 334 10E3/UL (ref 150–450)
POTASSIUM SERPL-SCNC: 3.1 MMOL/L (ref 3.4–5.3)
POTASSIUM SERPL-SCNC: 3.5 MMOL/L (ref 3.4–5.3)
RBC # BLD AUTO: 5.57 10E6/UL (ref 3.8–5.2)
SODIUM SERPL-SCNC: 132 MMOL/L (ref 135–145)
SODIUM SERPL-SCNC: 132 MMOL/L (ref 135–145)
WBC # BLD AUTO: 10.5 10E3/UL (ref 4–11)

## 2025-05-31 PROCEDURE — 250N000013 HC RX MED GY IP 250 OP 250 PS 637: Performed by: INTERNAL MEDICINE

## 2025-05-31 PROCEDURE — 36415 COLL VENOUS BLD VENIPUNCTURE: CPT | Performed by: NURSE PRACTITIONER

## 2025-05-31 PROCEDURE — 85018 HEMOGLOBIN: CPT | Performed by: NURSE PRACTITIONER

## 2025-05-31 PROCEDURE — 93005 ELECTROCARDIOGRAM TRACING: CPT

## 2025-05-31 PROCEDURE — 250N000013 HC RX MED GY IP 250 OP 250 PS 637: Performed by: HOSPITALIST

## 2025-05-31 PROCEDURE — 83735 ASSAY OF MAGNESIUM: CPT | Performed by: HOSPITALIST

## 2025-05-31 PROCEDURE — 93010 ELECTROCARDIOGRAM REPORT: CPT | Performed by: INTERNAL MEDICINE

## 2025-05-31 PROCEDURE — 99232 SBSQ HOSP IP/OBS MODERATE 35: CPT | Performed by: HOSPITALIST

## 2025-05-31 PROCEDURE — 250N000013 HC RX MED GY IP 250 OP 250 PS 637: Performed by: NURSE PRACTITIONER

## 2025-05-31 PROCEDURE — 36415 COLL VENOUS BLD VENIPUNCTURE: CPT | Performed by: HOSPITALIST

## 2025-05-31 PROCEDURE — 250N000011 HC RX IP 250 OP 636: Performed by: HOSPITALIST

## 2025-05-31 PROCEDURE — 80048 BASIC METABOLIC PNL TOTAL CA: CPT | Performed by: HOSPITALIST

## 2025-05-31 PROCEDURE — 999N000128 HC STATISTIC PERIPHERAL IV START W/O US GUIDANCE

## 2025-05-31 PROCEDURE — 82310 ASSAY OF CALCIUM: CPT | Performed by: HOSPITALIST

## 2025-05-31 PROCEDURE — 84100 ASSAY OF PHOSPHORUS: CPT | Performed by: HOSPITALIST

## 2025-05-31 PROCEDURE — 80048 BASIC METABOLIC PNL TOTAL CA: CPT | Performed by: NURSE PRACTITIONER

## 2025-05-31 PROCEDURE — 258N000003 HC RX IP 258 OP 636: Performed by: NURSE PRACTITIONER

## 2025-05-31 PROCEDURE — 120N000001 HC R&B MED SURG/OB

## 2025-05-31 PROCEDURE — 250N000011 HC RX IP 250 OP 636: Performed by: NURSE PRACTITIONER

## 2025-05-31 PROCEDURE — 82374 ASSAY BLOOD CARBON DIOXIDE: CPT | Performed by: NURSE PRACTITIONER

## 2025-05-31 RX ORDER — POTASSIUM CHLORIDE 1500 MG/1
40 TABLET, EXTENDED RELEASE ORAL ONCE
Status: COMPLETED | OUTPATIENT
Start: 2025-05-31 | End: 2025-05-31

## 2025-05-31 RX ORDER — SODIUM CHLORIDE AND POTASSIUM CHLORIDE 150; 900 MG/100ML; MG/100ML
INJECTION, SOLUTION INTRAVENOUS CONTINUOUS
Status: DISCONTINUED | OUTPATIENT
Start: 2025-05-31 | End: 2025-06-01

## 2025-05-31 RX ADMIN — ESCITALOPRAM OXALATE 20 MG: 20 TABLET, FILM COATED ORAL at 08:01

## 2025-05-31 RX ADMIN — SODIUM CHLORIDE AND POTASSIUM CHLORIDE: .9; .15 SOLUTION INTRAVENOUS at 11:22

## 2025-05-31 RX ADMIN — PROCHLORPERAZINE EDISYLATE 10 MG: 5 INJECTION INTRAMUSCULAR; INTRAVENOUS at 08:06

## 2025-05-31 RX ADMIN — LOPERAMIDE HYDROCHLORIDE 2 MG: 2 CAPSULE ORAL at 15:02

## 2025-05-31 RX ADMIN — ACETAMINOPHEN 650 MG: 325 TABLET, FILM COATED ORAL at 01:27

## 2025-05-31 RX ADMIN — BUPROPION HYDROCHLORIDE 150 MG: 150 TABLET, EXTENDED RELEASE ORAL at 08:01

## 2025-05-31 RX ADMIN — SODIUM CHLORIDE AND POTASSIUM CHLORIDE: .9; .15 SOLUTION INTRAVENOUS at 22:30

## 2025-05-31 RX ADMIN — LOPERAMIDE HYDROCHLORIDE 2 MG: 2 CAPSULE ORAL at 11:54

## 2025-05-31 RX ADMIN — SODIUM CHLORIDE, SODIUM LACTATE, POTASSIUM CHLORIDE, AND CALCIUM CHLORIDE: .6; .31; .03; .02 INJECTION, SOLUTION INTRAVENOUS at 08:00

## 2025-05-31 RX ADMIN — LOPERAMIDE HYDROCHLORIDE 2 MG: 2 CAPSULE ORAL at 21:42

## 2025-05-31 RX ADMIN — ACETAMINOPHEN 650 MG: 325 TABLET, FILM COATED ORAL at 18:21

## 2025-05-31 RX ADMIN — LOPERAMIDE HYDROCHLORIDE 2 MG: 2 CAPSULE ORAL at 00:15

## 2025-05-31 RX ADMIN — POTASSIUM CHLORIDE 40 MEQ: 1500 TABLET, EXTENDED RELEASE ORAL at 11:54

## 2025-05-31 ASSESSMENT — ACTIVITIES OF DAILY LIVING (ADL)
ADLS_ACUITY_SCORE: 43
ADLS_ACUITY_SCORE: 41
ADLS_ACUITY_SCORE: 43
ADLS_ACUITY_SCORE: 34
ADLS_ACUITY_SCORE: 43
ADLS_ACUITY_SCORE: 34
ADLS_ACUITY_SCORE: 43
ADLS_ACUITY_SCORE: 42
ADLS_ACUITY_SCORE: 43
ADLS_ACUITY_SCORE: 34
ADLS_ACUITY_SCORE: 43
ADLS_ACUITY_SCORE: 57
ADLS_ACUITY_SCORE: 41

## 2025-05-31 NOTE — PROGRESS NOTES
MD Notification    Notified Person: MD    Notified Person Name: Dr. Mandel Chapman    Notification Date/Time: 5/30/25 [id number]    Notification Interaction: Vocterri    Purpose of Notification: Patient just came in from ER for diarrhea but she no anti diarrhea meds. She felt cramping at the back and stomach. Pls advise.    Orders Received: Imodium prn ordered    Comments:

## 2025-05-31 NOTE — PROGRESS NOTES
RECEIVING UNIT ED HANDOFF REVIEW    ED Nurse Handoff Report was reviewed by: Enoc Munoz RN on May 30, 2025 at 8:38 PM

## 2025-05-31 NOTE — PLAN OF CARE
Goal Outcome Evaluation:     Summary:  N/V, diarrhea    DATE & TIME: 05/30/25 to 5/31/25 8794-5496    Cognitive Concerns/ Orientation : AOx4   BEHAVIOR & AGGRESSION TOOL COLOR: Green  CIWA SCORE: NA   ABNL VS/O2: VSS on RA  MOBILITY: SBA  PAIN MANAGMENT: abdominal pain, Tylenol x1  DIET: gen liq  BOWEL/BLADDER: incontinent of B&B BMx2  ABNL LAB/BG: see labs  DRAIN/DEVICES: infusing LR x100  TELEMETRY RHYTHM: Sinus Tachycardia  SKIN: intact with some bruising, velvety patches on both armpit,  skin rashes on abdomen  TESTS/PROCEDURES: NA  D/C DAY/GOALS/PLACE: pending,   OTHER IMPORTANT INFO:       Admission    Patient arrives to room 628 via cart from ER.  Care plan note: completed    Inpatient nursing criteria listed below were met:    Did you put disposition on whiteboard and in sticky note: Yes  Full skin assessment done (add LDA if skin issue present). Initials of 2nd RN :Yes, CECILIO, RN  Isolation education started/completed Yes  Patient allergies verified with patient: No  Fall Risk? (Care plan updated, Education given and documented) Yes  Primary Care Plan initiated: Yes  Home medications documented in belongings flowsheet: No  Patient belongings documented in belongings flowsheet: Yes  Reminder note (belongings/ medications) placed in discharge instructions:No  Admission profile/ required documentation complete: Yes  If patient is a 72 hour hold/Commitment are belongings removed from room and locked up? NA

## 2025-05-31 NOTE — PROVIDER NOTIFICATION
MD Notification    Notified Person: MD    Notified Person Name: Dr Schulz    Notification Date/Time:5/31/25 0742    Notification Interaction: VM    Purpose of Notification FYI K+ 3.1, do you want to order protocol?      Orders Received:    Comments:

## 2025-05-31 NOTE — PROGRESS NOTES
"[facility]    Medicine Progress Note - Hospitalist Service    Date of Admission:  5/30/2025    Assessment & Plan   Jennifer Okeefe is a 29 year old female with a past medical history significant for ADHD, dyslipidemia, depression, obesity who presents to the ED for evaluation of near syncope, nausea, vomiting, diarrhea.      Patient states she has been on Zepbound since January 2025, started at 2.5 mg and slowly titrated up to 10 mg.  In mid April 2025, the patient had to take a 1 month break due to a prior authorization being collected from her insurance.  Once the PA was obtained, she was restarted at her previous dose of 10 mg.  Shortly after administration of the 10 mg, the patient began to have nausea, which progressed into vomiting and watery diarrhea.  She was unable to tolerate any fluids, became oliguric on 5/29, and near syncopal bite 5/30 presented to the emergency department     Acute Renal Failure improving  Anion gap metabolic acidosis, resolved  Diarrhea  Nausea/Vomiting  Suspect prerenal in the setting of profound hypovolemia due to insensible loss given watery diarrhea.  Patient states her last time urinating was 5/29, \" just a small amount\"; and none since.  She was straight cathed on admission, UA shows trace ketones, proteinuria, urobilinogen 2; nitrite negative, small amount of blood, WBC 2, mucus and hyaline cast present.  *ED workup: Creatinine 3.96, GFR 15, potassium 4.0, magnesium 2.1, serum bicarb 17.  Initial VBG with mild acidosis with pH 7.28, pCO2 44, bicarb 21; labs repeated approximately 4 hours later showing improvement, creatinine 2.67, GFR 24, metabolic acidosis present with AG 17, serum bicarb 17.  *CT abdomen pelvis showing 1 mm stone in the right mid kidney, no ureteral stones, no hydronephrosis; abdomen otherwise unremarkable.  *ED course: Given 2 L 0.9% IV fluid, 2 g mag IV x 1;   - IV fluids NS with 20meq KCl @100 ml/hr.    - advance diet as " tolerated  - PRN imodium for diarrhea  - essentially we are awaiting washout of zepbound (last taken 5/25)     Hypochloremic hyponatremia, resolved  Lactic acidosis, 2.2 > 1.8  Likely hypovolemic in nature, resolved and repeat labs after 2 L IVF demonstrating resolution of hyponatremia with repeat sodium 136.  *Blood cultures collected in the emergency department, low suspicion of infectious etiology.  No antibiotics initiated given lack of source and other etiology present.    Hypokalemia  Repleted  - IVF as above  - BMP in AM     Prolonged QT  ECG on admission shows sinus tach without any acute ST/T wave changes consistent with acute ischemia, QTc 492.  - Repeat ECG ordered, not yet completed  - Avoid QTc prolonging meds in the meantime (PTA trazodone, Zofran, etc)     HLP  - Hold PTA statin.       Obesity   *There is no height or weight on file to calculate BMI. Increase in all-cause morbidity and mortality.   - Follow up with PCP regarding ongoing management.    - Hold Zepbound on discharge, discuss with weight management team     Major depressive disorder with anxiety  - Resumed on PTA Lexapro and Wellbutrin to avoid withdrawal symptoms  - Hold trazodone          Diet: Regular Diet Adult    DVT Prophylaxis: Pneumatic Compression Devices  Purdy Catheter: Not present  Lines: None     Cardiac Monitoring: ACTIVE order. Indication: QTc prolonging medication (48 hours)  Code Status: Full Code      Clinically Significant Risk Factors Present on Admission        # Hypokalemia: Lowest K = 3.1 mmol/L in last 2 days, will replace as needed  # Hyponatremia: Lowest Na = 132 mmol/L in last 2 days, will monitor as appropriate  # Hypochloremia: Lowest Cl = 95 mmol/L in last 2 days, will monitor as appropriate     # Anion Gap Metabolic Acidosis: Highest Anion Gap = 20 mmol/L in last 2 days, will monitor and treat as appropriate                           Social Drivers of Health    Food Insecurity: Food Insecurity Present  (4/5/2025)    Received from [employer]    Hunger Vital Sign     Worried About Running Out of Food in the Last Year: Sometimes true     Ran Out of Food in the Last Year: Never true   Depression: At risk (11/21/2024)    Received from [employer]    PHQ-2     PHQ-2 Score: 3   Financial Resource Strain: Not on File (10/2/2024)    Received from [employer]    Financial Resource Strain     Financial Resource Strain: 0   Physical Activity: Not on File (10/2/2024)    Received from [employer]    Physical Activity     Physical Activity: 0   Stress: Not on File (10/2/2024)    Received from [employer]    Stress     Stress: 0   Social Connections: Not on File (10/2/2024)    Received from [employer]    Social Connections     Connectedness: 0          Disposition Plan     Medically Ready for Discharge: Anticipated in 2-4 Days  Pending ability to tolerate diet, controlled diarrhea and renal function back to baseline           Alee Schulz DO  Hospitalist Service  [facility]  Securely message with [employer] (more info)  Text page via [employer] Paging/Directory   ______________________________________________________________________    Interval History   Patient seen and examined. She is doing okay. Sleepy this morning after compazine. Vomited morning meds. Hopefully can tolerate more and more each day. Adjusted fluids today. PRN imodium given. Denies shortness of breath with current fluids. Mother at bedside, discussed plan.    Physical Exam   Vital Signs: Temp: 97.5  F (36.4  C) Temp src: Oral BP: 117/74 Pulse: (!) 126   Resp: 18 SpO2: 96 % O2 Device: None (Room air)    Weight: 317 lbs 3.2 oz    Constitutional: Drowsy, cooperative, no apparent distress  Respiratory: Clear to auscultation bilaterally, no crackles or wheezing  Cardiovascular: Regular rhythm, rate 120s, normal S1 and S2, and no murmur noted  GI: Normal bowel sounds, soft, non-distended, non-tender, obese  Skin/Integumen: No rashes, no cyanosis, no  edema  Other:      Medical Decision Making       45 MINUTES SPENT BY ME on the date of service doing chart review, history, exam, documentation & further activities per the note.      Data     I have personally reviewed the following data over the past 24 hrs:    10.5  \   16.1 (H)   / 334     132 (L) 101 12.8 /  99   3.5 19 (L) 1.65 (H) \     Procal: N/A CRP: N/A Lactic Acid: 1.8         Imaging results reviewed over the past 24 hrs:   Recent Results (from the past 24 hours)   Abd/pelvis CT no contrast - Stone Protocol    Narrative    EXAM: CT ABDOMEN PELVIS W/O CONTRAST  LOCATION: [facility]  DATE: 5/30/2025    INDICATION: new renal failure, nausea, vomiting, diarrhea  COMPARISON: None.  TECHNIQUE: CT scan of the abdomen and pelvis was performed without IV contrast. Multiplanar reformats were obtained. Dose reduction techniques were used.  CONTRAST: None.    FINDINGS:   LOWER CHEST: Normal.    HEPATOBILIARY: Normal.    PANCREAS: Normal.    SPLEEN: Normal.    ADRENAL GLANDS: Normal.    KIDNEYS/BLADDER: There is a 1 mm stone in the mid right kidney. No other stones. No ureteral calculi. No hydronephrosis.    BOWEL: Normal.    LYMPH NODES: Normal.    VASCULATURE: Normal.    PELVIC ORGANS: Intrauterine device.    MUSCULOSKELETAL: Normal.      Impression    IMPRESSION:   1.  There is a 1 mm stone in the mid right kidney. No ureteral stones. No hydronephrosis. Abdomen otherwise negative.

## 2025-05-31 NOTE — PLAN OF CARE
Goal Outcome Evaluation:  Summary:  N/V, diarrhea    DATE & TIME: 5/31/25 0542-0426   Cognitive Concerns/ Orientation : AOx4   BEHAVIOR & AGGRESSION TOOL COLOR: Green  CIWA SCORE: NA   ABNL VS/O2: VSS on RA  MOBILITY: Independent  PAIN MANAGMENT: Denied  DIET: Regular  BOWEL/BLADDER: Independent to bathroom,Can be incontinent, has had 4 loose stools/greenish, gave imodium X3  ABNL LAB/BG: K+3.5  DRAIN/DEVICES: NS+20KCL @100  TELEMETRY RHYTHM: NSR  SKIN: intact with some bruising  TESTS/PROCEDURES: EKG  D/C DAY/GOALS/PLACE: Pending   OTHER IMPORTANT INFO:

## 2025-06-01 VITALS
HEART RATE: 100 BPM | HEIGHT: 72 IN | OXYGEN SATURATION: 97 % | RESPIRATION RATE: 18 BRPM | WEIGHT: 293 LBS | DIASTOLIC BLOOD PRESSURE: 77 MMHG | SYSTOLIC BLOOD PRESSURE: 123 MMHG | BODY MASS INDEX: 39.68 KG/M2 | TEMPERATURE: 97.8 F

## 2025-06-01 LAB
ANION GAP SERPL CALCULATED.3IONS-SCNC: 11 MMOL/L (ref 7–15)
ATRIAL RATE - MUSE: 117 BPM
BUN SERPL-MCNC: 9.6 MG/DL (ref 6–20)
CALCIUM SERPL-MCNC: 8.6 MG/DL (ref 8.8–10.4)
CHLORIDE SERPL-SCNC: 105 MMOL/L (ref 98–107)
CREAT SERPL-MCNC: 1.17 MG/DL (ref 0.51–0.95)
DIASTOLIC BLOOD PRESSURE - MUSE: NORMAL MMHG
EGFRCR SERPLBLD CKD-EPI 2021: 65 ML/MIN/1.73M2
ERYTHROCYTE [DISTWIDTH] IN BLOOD BY AUTOMATED COUNT: 13.5 % (ref 10–15)
GLUCOSE SERPL-MCNC: 109 MG/DL (ref 70–99)
HCO3 SERPL-SCNC: 19 MMOL/L (ref 22–29)
HCT VFR BLD AUTO: 45.3 % (ref 35–47)
HGB BLD-MCNC: 14.8 G/DL (ref 11.7–15.7)
INTERPRETATION ECG - MUSE: NORMAL
MAGNESIUM SERPL-MCNC: 2.1 MG/DL (ref 1.7–2.3)
MCH RBC QN AUTO: 28.6 PG (ref 26.5–33)
MCHC RBC AUTO-ENTMCNC: 32.7 G/DL (ref 31.5–36.5)
MCV RBC AUTO: 88 FL (ref 78–100)
P AXIS - MUSE: 38 DEGREES
PHOSPHATE SERPL-MCNC: 2.2 MG/DL (ref 2.5–4.5)
PLATELET # BLD AUTO: 286 10E3/UL (ref 150–450)
POTASSIUM SERPL-SCNC: 3.6 MMOL/L (ref 3.4–5.3)
PR INTERVAL - MUSE: 128 MS
QRS DURATION - MUSE: 78 MS
QT - MUSE: 312 MS
QTC - MUSE: 435 MS
R AXIS - MUSE: 23 DEGREES
RBC # BLD AUTO: 5.18 10E6/UL (ref 3.8–5.2)
SODIUM SERPL-SCNC: 135 MMOL/L (ref 135–145)
SYSTOLIC BLOOD PRESSURE - MUSE: NORMAL MMHG
T AXIS - MUSE: -17 DEGREES
VENTRICULAR RATE- MUSE: 117 BPM
WBC # BLD AUTO: 14.2 10E3/UL (ref 4–11)

## 2025-06-01 PROCEDURE — 84100 ASSAY OF PHOSPHORUS: CPT | Performed by: HOSPITALIST

## 2025-06-01 PROCEDURE — 82435 ASSAY OF BLOOD CHLORIDE: CPT | Performed by: HOSPITALIST

## 2025-06-01 PROCEDURE — 83735 ASSAY OF MAGNESIUM: CPT | Performed by: HOSPITALIST

## 2025-06-01 PROCEDURE — 250N000009 HC RX 250: Performed by: HOSPITALIST

## 2025-06-01 PROCEDURE — 250N000013 HC RX MED GY IP 250 OP 250 PS 637: Performed by: NURSE PRACTITIONER

## 2025-06-01 PROCEDURE — 36415 COLL VENOUS BLD VENIPUNCTURE: CPT | Performed by: HOSPITALIST

## 2025-06-01 PROCEDURE — 80048 BASIC METABOLIC PNL TOTAL CA: CPT | Performed by: HOSPITALIST

## 2025-06-01 PROCEDURE — 258N000003 HC RX IP 258 OP 636: Performed by: HOSPITALIST

## 2025-06-01 PROCEDURE — 99239 HOSP IP/OBS DSCHRG MGMT >30: CPT | Performed by: HOSPITALIST

## 2025-06-01 PROCEDURE — 85027 COMPLETE CBC AUTOMATED: CPT | Performed by: HOSPITALIST

## 2025-06-01 PROCEDURE — 250N000013 HC RX MED GY IP 250 OP 250 PS 637: Performed by: INTERNAL MEDICINE

## 2025-06-01 RX ORDER — POLYETHYLENE GLYCOL 3350 17 G/17G
1 POWDER, FOR SOLUTION ORAL DAILY
COMMUNITY
Start: 2025-06-01

## 2025-06-01 RX ORDER — LOPERAMIDE HYDROCHLORIDE 2 MG/1
2 CAPSULE ORAL 4 TIMES DAILY PRN
Qty: 12 CAPSULE | Refills: 0 | Status: SHIPPED | OUTPATIENT
Start: 2025-06-01

## 2025-06-01 RX ADMIN — ESCITALOPRAM OXALATE 20 MG: 20 TABLET, FILM COATED ORAL at 08:22

## 2025-06-01 RX ADMIN — LOPERAMIDE HYDROCHLORIDE 2 MG: 2 CAPSULE ORAL at 06:40

## 2025-06-01 RX ADMIN — SODIUM PHOSPHATE, MONOBASIC, MONOHYDRATE AND SODIUM PHOSPHATE, DIBASIC, ANHYDROUS 15 MMOL: 142; 276 INJECTION, SOLUTION INTRAVENOUS at 11:41

## 2025-06-01 RX ADMIN — BUPROPION HYDROCHLORIDE 150 MG: 150 TABLET, EXTENDED RELEASE ORAL at 08:21

## 2025-06-01 ASSESSMENT — ACTIVITIES OF DAILY LIVING (ADL)
ADLS_ACUITY_SCORE: 42
ADLS_ACUITY_SCORE: 39
ADLS_ACUITY_SCORE: 42
ADLS_ACUITY_SCORE: 39
ADLS_ACUITY_SCORE: 42
ADLS_ACUITY_SCORE: 39
ADLS_ACUITY_SCORE: 42
ADLS_ACUITY_SCORE: 39
ADLS_ACUITY_SCORE: 42
ADLS_ACUITY_SCORE: 42
ADLS_ACUITY_SCORE: 39
ADLS_ACUITY_SCORE: 39
ADLS_ACUITY_SCORE: 42
ADLS_ACUITY_SCORE: 39
ADLS_ACUITY_SCORE: 39
ADLS_ACUITY_SCORE: 42
ADLS_ACUITY_SCORE: 39

## 2025-06-01 NOTE — PROVIDER NOTIFICATION
MD Notification    Notified Person: MD    Notified Person Name: Dr Schulz    Notification Date/Time:6/1/25 8250    Notification Interaction: VM    Purpose of Notification:  FYI: She is feeling good, only 1 BM & starting to be more formed. Would like to be discharged. I have about 1.5 hours left on PHosp IV replacement    Orders Received:    Comments:

## 2025-06-01 NOTE — DISCHARGE INSTRUCTIONS
Upcoming Encounters in your primary health system:    Date Type Department Care Team    06/11/2025 2:20 PM  Appointment [facility] [place]   [address] [place] [address]   [phone]  Elsy Zhu MD   [address] [place] [address]   [phone] (Work)   [phone] (Fax)    08/06/2025 11:30 AM  Telemedicine [facility]   [address]   [place] [address]   [phone]  Elisa Rajput MD   [address] [place] [address]   [phone] (Work)   [phone] (Fax)

## 2025-06-01 NOTE — PLAN OF CARE
Goal Outcome Evaluation:       DATE & TIME: 6/1/25 1844-4375    Cognitive Concerns/ Orientation : A&OX4   BEHAVIOR & AGGRESSION TOOL COLOR: Green  CIWA SCORE: NA   ABNL VS/O2: Vss, on RA  MOBILITY: Independent  PAIN MANAGMENT: Denied  DIET: Regular/tolerating  BOWEL/BLADDER: Continent/had 1 loose stool but states it is firming up. No imodium given  ABNL LAB/BG: KL+3.6 Mg 2.1 Phos 2.2/replacing  DRAIN/DEVICES: NA  TELEMETRY RHYTHM: NA  SKIN: Scattered bruises o/w WNL  TESTS/PROCEDURES: NA  D/C DATE: Possibly today

## 2025-06-01 NOTE — PROGRESS NOTES
Discharge    Patient discharged to home via wheelchair with Mom transportation.  Care plan note done.    Listed belongings gathered and given to patient (including from security/pharmacy). Yes  Care Plan and Patient education resolved: Yes  Prescriptions if needed, hard copies sent with patient  Yes  Medication Bin checked and emptied on discharge Yes  SW/care coordinator/charge RN aware of discharge: Yes

## 2025-06-01 NOTE — DISCHARGE SUMMARY
[facility]  Hospitalist Discharge Summary      Date of Admission:  5/30/2025  Date of Discharge:  6/1/2025  Discharging Provider: Alee Schulz DO  Discharge Service: Hospitalist Service    Discharge Diagnoses   Acute Renal Failure improving  Anion gap metabolic acidosis, resolved  Diarrhea  Nausea/Vomiting  Hypochloremic hyponatremia, resolved  Lactic acidosis  Hypokalemia  Prolonged QT  HLP  Obesity   Major depressive disorder with anxiety    Clinically Significant Risk Factors     # Morbid Obesity: Estimated body mass index is 43.07 kg/m  as calculated from the following:    Height as of this encounter: 1.829 m (6').    Weight as of this encounter: 144.1 kg (317 lb 9.6 oz).       Follow-ups Needed After Discharge   Follow-up Appointments       Hospital Follow-up with Existing Primary Care Provider (PCP)          Schedule Primary Care visit within: 14 Days   Recommended labs and Imaging (to be ordered by Primary Care Provider): BMP (ordered)             Unresulted Labs Ordered in the Past 30 Days of this Admission       Date and Time Order Name Status Description    5/30/2025  4:38 PM Blood Culture Peripheral blood (BC) Hand, Left Preliminary     5/30/2025  4:38 PM Blood Culture Peripheral blood (BC) Arm, Right Preliminary         These results will be followed up by hospitalist results review. No suspicion of bacteremia, not maintained on abx during stay    Discharge Disposition   Discharged to home  Condition at discharge: Stable    Hospital Course   Jennifer Okeefe is a 29 year old female with a past medical history significant for ADHD, dyslipidemia, depression, obesity who presents to the ED for evaluation of near syncope, nausea, vomiting, diarrhea.      Patient states she has been on Zepbound since January 2025, started at 2.5 mg and slowly titrated up to 10 mg.  In mid April 2025, the patient had to take a 1 month break due to a prior authorization being collected from her  "insurance.  Once the PA was obtained, she was restarted at her previous dose of 10 mg.  Shortly after administration of the 10 mg, the patient began to have nausea, which progressed into vomiting and watery diarrhea.  She was unable to tolerate any fluids, became oliguric on 5/29, and near syncopal bite 5/30 presented to the emergency department     Acute Renal Failure improving  Anion gap metabolic acidosis, resolved  Diarrhea  Nausea/Vomiting  Suspect prerenal in the setting of profound hypovolemia due to insensible loss given watery diarrhea.  Patient states her last time urinating was 5/29, \" just a small amount\"; and none since.  She was straight cathed on admission, UA shows trace ketones, proteinuria, urobilinogen 2; nitrite negative, small amount of blood, WBC 2, mucus and hyaline cast present.  *ED workup: Creatinine 3.96, GFR 15, potassium 4.0, magnesium 2.1, serum bicarb 17.  Initial VBG with mild acidosis with pH 7.28, pCO2 44, bicarb 21; labs repeated approximately 4 hours later showing improvement, creatinine 2.67, GFR 24, metabolic acidosis present with AG 17, serum bicarb 17.  *CT abdomen pelvis showing 1 mm stone in the right mid kidney, no ureteral stones, no hydronephrosis; abdomen otherwise unremarkable.  *ED course: Given 2 L 0.9% IV fluid, 2 g mag IV x 1;   - Received IVF until AM 6/1  - regular diet tolerated, recommended to stick to blander foods until diarrhea resolved  - PRN imodium for diarrhea  - hold off on zepbound, follow up with prescriber to discuss resumption and dosing if desired  - PCP follow up on 6/11 with repeat BMP at that time     Hypochloremic hyponatremia, resolved  Lactic acidosis, 2.2 > 1.8  Likely hypovolemic in nature, resolved and repeat labs after 2 L IVF demonstrating resolution of hyponatremia with repeat sodium 136.  *Blood cultures collected in the emergency department, low suspicion of infectious etiology.  No antibiotics initiated given lack of source and other " etiology present. NGTD on discharge     Hypokalemia  Hypophosphatemia  Repleted     Prolonged QT, improved  ECG on admission shows sinus tach without any acute ST/T wave changes consistent with acute ischemia, QTc 492. Repeat EKG with QTc 430s     HLP  - resume statin on discharge     Obesity   *There is no height or weight on file to calculate BMI. Increase in all-cause morbidity and mortality.   - Follow up with PCP regarding ongoing management.    - Hold Zepbound on discharge, discuss with weight management team     Major depressive disorder with anxiety  - Resumed on PTA Lexapro, wellbutrin, trazodone.    Consultations This Hospital Stay   VASCULAR ACCESS ADULT IP CONSULT    Code Status   Full Code    Time Spent on this Encounter   I, Alee Schulz DO, personally saw the patient today and spent greater than 30 minutes discharging this patient.       Alee Schulz DO  [place] [address] [facility]  [address] [place] [address]  Phone: [phone]  ______________________________________________________________________    Physical Exam   Vital Signs: Temp: 98.1  F (36.7  C) Temp src: Oral BP: 106/67 Pulse: 100   Resp: 18 SpO2: 96 % O2 Device: None (Room air)    Weight: 317 lbs 9.6 oz    Patient seen and examined. She is feeling much better today. Stools starting to get more formed. Not having accidental stooling. No vomiting. Still with some abdominal pain, getting better. Tolerating diet. Stable for discharge to home.    Constitutional: awake, alert, cooperative, no apparent distress  Respiratory: Clear to auscultation bilaterally, no crackles or wheezing  Cardiovascular: Regular rhythm, rate near 100, normal S1 and S2, and no murmur noted  GI: Normal bowel sounds, soft, non-distended, non-tender, obese  Skin/Integumen: No rashes, no cyanosis, no edema  Other:          Primary Care Physician   Elsy Zhu    Discharge Orders      Basic metabolic panel     Reason for  your hospital stay    Nausea, vomiting and diarrhea after resuming high dose zepbound     Activity    Your activity upon discharge: activity as tolerated     Discharge Instructions    1. Take imodium as needed for diarrhea    2. Do not restart zepbound until talking to your primary care doctor.    3. Consider starting miralax once daily to help soften stools while you are on zepbound as this can lead to constipation (prescription written for over the counter medication--do not start until you have restarted zepbound)     Diet    Follow this diet upon discharge: Regular diet, try to switch to blander (not spicy, fatty/greasy foods, salads) until your stools are back to normal     Hospital Follow-up with Existing Primary Care Provider (PCP)            Significant Results and Procedures   Most Recent 3 CBC's:  Recent Labs   Lab Test 06/01/25  0932 05/31/25  0602 05/30/25  1604   WBC 14.2* 10.5 9.2   HGB 14.8 16.1* 19.5*   MCV 88 85 88    334 402     Most Recent 3 BMP's:  Recent Labs   Lab Test 06/01/25  0932 05/31/25  1252 05/31/25  0602    132* 132*   POTASSIUM 3.6 3.5 3.1*   CHLORIDE 105 101 101   CO2 19* 19* 17*   BUN 9.6 12.8 13.0   CR 1.17* 1.65* 2.00*   ANIONGAP 11 12 14   JACE 8.6* 8.7* 8.7*   * 99 109*   ,   Results for orders placed or performed during the hospital encounter of 05/30/25   Abd/pelvis CT no contrast - Stone Protocol    Narrative    EXAM: CT ABDOMEN PELVIS W/O CONTRAST  LOCATION: [facility]  DATE: 5/30/2025    INDICATION: new renal failure, nausea, vomiting, diarrhea  COMPARISON: None.  TECHNIQUE: CT scan of the abdomen and pelvis was performed without IV contrast. Multiplanar reformats were obtained. Dose reduction techniques were used.  CONTRAST: None.    FINDINGS:   LOWER CHEST: Normal.    HEPATOBILIARY: Normal.    PANCREAS: Normal.    SPLEEN: Normal.    ADRENAL GLANDS: Normal.    KIDNEYS/BLADDER: There is a 1 mm stone in the mid right kidney. No  other stones. No ureteral calculi. No hydronephrosis.    BOWEL: Normal.    LYMPH NODES: Normal.    VASCULATURE: Normal.    PELVIC ORGANS: Intrauterine device.    MUSCULOSKELETAL: Normal.      Impression    IMPRESSION:   1.  There is a 1 mm stone in the mid right kidney. No ureteral stones. No hydronephrosis. Abdomen otherwise negative.         Discharge Medications   Current Discharge Medication List        START taking these medications    Details   loperamide (IMODIUM) 2 MG capsule Take 1 capsule (2 mg) by mouth 4 times daily as needed for diarrhea.  Qty: 12 capsule, Refills: 0    Associated Diagnoses: Nausea vomiting and diarrhea      polyethylene glycol (MIRALAX) 17 GM/Dose powder Take 17 g (1 Capful) by mouth daily.    Associated Diagnoses: Drug-induced constipation           CONTINUE these medications which have NOT CHANGED    Details   amphetamine-dextroamphetamine (ADDERALL XR) 30 MG 24 hr capsule Take 30 mg by mouth daily.      atorvastatin (LIPITOR) 20 MG tablet Take 20 mg by mouth daily.      buPROPion (WELLBUTRIN XL) 150 MG 24 hr tablet Take 150 mg by mouth every morning.      Escitalopram Oxalate (LEXAPRO PO) Take 20 mg by mouth daily.      hydrOXYzine HCl (ATARAX) 25 MG tablet Take 25 mg by mouth at bedtime.      NAPROXEN PO Take 1 tablet by mouth 2 times daily as needed.      TRAZODONE HCL PO Take 100 mg by mouth at bedtime.      order for [facility] left knee; non-surgical; PT alone not helping with quadriceps atrophy and inhibition      ZEPBOUND 10 MG/0.5ML prefilled pen Inject 10 mg subcutaneously every 7 days.           Allergies   No Known Allergies

## 2025-06-01 NOTE — PLAN OF CARE
Goal Outcome Evaluation:      Plan of Care Reviewed With: patient    Overall Patient Progress: improvingOverall Patient Progress: improving             Patient discharging home. Discharge instructions reviewed with patient. Teach back done and patient able to teach back. Gave patient discharge med--Imodium from pharmacy and discharge instructions papers. Patient took all her belongings with her. Patient mom transporting her home.

## 2025-06-04 LAB
BACTERIA SPEC CULT: NO GROWTH
BACTERIA SPEC CULT: NO GROWTH

## 2025-06-14 ENCOUNTER — HEALTH MAINTENANCE LETTER (OUTPATIENT)
Age: 30
End: 2025-06-14